# Patient Record
Sex: FEMALE | Race: BLACK OR AFRICAN AMERICAN | NOT HISPANIC OR LATINO | URBAN - METROPOLITAN AREA
[De-identification: names, ages, dates, MRNs, and addresses within clinical notes are randomized per-mention and may not be internally consistent; named-entity substitution may affect disease eponyms.]

---

## 2024-08-01 ENCOUNTER — INPATIENT (INPATIENT)
Facility: HOSPITAL | Age: 74
LOS: 2 days | Discharge: HOME | DRG: 66 | End: 2024-08-04
Attending: STUDENT IN AN ORGANIZED HEALTH CARE EDUCATION/TRAINING PROGRAM | Admitting: STUDENT IN AN ORGANIZED HEALTH CARE EDUCATION/TRAINING PROGRAM
Payer: COMMERCIAL

## 2024-08-01 VITALS
HEART RATE: 103 BPM | HEIGHT: 65 IN | OXYGEN SATURATION: 99 % | DIASTOLIC BLOOD PRESSURE: 129 MMHG | WEIGHT: 130.07 LBS | RESPIRATION RATE: 20 BRPM | TEMPERATURE: 98 F | SYSTOLIC BLOOD PRESSURE: 194 MMHG

## 2024-08-01 LAB
ALBUMIN SERPL ELPH-MCNC: 4.2 G/DL — SIGNIFICANT CHANGE UP (ref 3.3–5)
ALP SERPL-CCNC: 122 U/L — HIGH (ref 40–120)
ALT FLD-CCNC: 16 U/L — SIGNIFICANT CHANGE UP (ref 10–45)
ANION GAP SERPL CALC-SCNC: 19 MMOL/L — HIGH (ref 5–17)
APTT BLD: 29.2 SEC — SIGNIFICANT CHANGE UP (ref 24.5–35.6)
AST SERPL-CCNC: 21 U/L — SIGNIFICANT CHANGE UP (ref 10–40)
BASOPHILS # BLD AUTO: 0.03 K/UL — SIGNIFICANT CHANGE UP (ref 0–0.2)
BASOPHILS NFR BLD AUTO: 0.4 % — SIGNIFICANT CHANGE UP (ref 0–2)
BILIRUB SERPL-MCNC: 0.6 MG/DL — SIGNIFICANT CHANGE UP (ref 0.2–1.2)
BUN SERPL-MCNC: 15 MG/DL — SIGNIFICANT CHANGE UP (ref 7–23)
CALCIUM SERPL-MCNC: 9.9 MG/DL — SIGNIFICANT CHANGE UP (ref 8.4–10.5)
CHLORIDE SERPL-SCNC: 98 MMOL/L — SIGNIFICANT CHANGE UP (ref 96–108)
CO2 SERPL-SCNC: 22 MMOL/L — SIGNIFICANT CHANGE UP (ref 22–31)
CREAT SERPL-MCNC: 0.85 MG/DL — SIGNIFICANT CHANGE UP (ref 0.5–1.3)
EGFR: 71 ML/MIN/1.73M2 — SIGNIFICANT CHANGE UP
EOSINOPHIL # BLD AUTO: 0.02 K/UL — SIGNIFICANT CHANGE UP (ref 0–0.5)
EOSINOPHIL NFR BLD AUTO: 0.3 % — SIGNIFICANT CHANGE UP (ref 0–6)
GLUCOSE SERPL-MCNC: 245 MG/DL — HIGH (ref 70–99)
HCT VFR BLD CALC: 40.5 % — SIGNIFICANT CHANGE UP (ref 34.5–45)
HGB BLD-MCNC: 14 G/DL — SIGNIFICANT CHANGE UP (ref 11.5–15.5)
IMM GRANULOCYTES NFR BLD AUTO: 0.3 % — SIGNIFICANT CHANGE UP (ref 0–0.9)
INR BLD: 1.07 RATIO — SIGNIFICANT CHANGE UP (ref 0.85–1.18)
LYMPHOCYTES # BLD AUTO: 2.82 K/UL — SIGNIFICANT CHANGE UP (ref 1–3.3)
LYMPHOCYTES # BLD AUTO: 37.4 % — SIGNIFICANT CHANGE UP (ref 13–44)
MAGNESIUM SERPL-MCNC: 2 MG/DL — SIGNIFICANT CHANGE UP (ref 1.6–2.6)
MCHC RBC-ENTMCNC: 29.7 PG — SIGNIFICANT CHANGE UP (ref 27–34)
MCHC RBC-ENTMCNC: 34.6 GM/DL — SIGNIFICANT CHANGE UP (ref 32–36)
MCV RBC AUTO: 86 FL — SIGNIFICANT CHANGE UP (ref 80–100)
MONOCYTES # BLD AUTO: 0.6 K/UL — SIGNIFICANT CHANGE UP (ref 0–0.9)
MONOCYTES NFR BLD AUTO: 7.9 % — SIGNIFICANT CHANGE UP (ref 2–14)
NEUTROPHILS # BLD AUTO: 4.06 K/UL — SIGNIFICANT CHANGE UP (ref 1.8–7.4)
NEUTROPHILS NFR BLD AUTO: 53.7 % — SIGNIFICANT CHANGE UP (ref 43–77)
NRBC # BLD: 0 /100 WBCS — SIGNIFICANT CHANGE UP (ref 0–0)
PHOSPHATE SERPL-MCNC: 2.5 MG/DL — SIGNIFICANT CHANGE UP (ref 2.5–4.5)
PLATELET # BLD AUTO: 294 K/UL — SIGNIFICANT CHANGE UP (ref 150–400)
POTASSIUM SERPL-MCNC: 3.1 MMOL/L — LOW (ref 3.5–5.3)
POTASSIUM SERPL-SCNC: 3.1 MMOL/L — LOW (ref 3.5–5.3)
PROT SERPL-MCNC: 9.2 G/DL — HIGH (ref 6–8.3)
PROTHROM AB SERPL-ACNC: 11.7 SEC — SIGNIFICANT CHANGE UP (ref 9.5–13)
RBC # BLD: 4.71 M/UL — SIGNIFICANT CHANGE UP (ref 3.8–5.2)
RBC # FLD: 12.2 % — SIGNIFICANT CHANGE UP (ref 10.3–14.5)
SODIUM SERPL-SCNC: 139 MMOL/L — SIGNIFICANT CHANGE UP (ref 135–145)
TROPONIN T, HIGH SENSITIVITY RESULT: <6 NG/L — SIGNIFICANT CHANGE UP (ref 0–51)
WBC # BLD: 7.55 K/UL — SIGNIFICANT CHANGE UP (ref 3.8–10.5)
WBC # FLD AUTO: 7.55 K/UL — SIGNIFICANT CHANGE UP (ref 3.8–10.5)

## 2024-08-01 PROCEDURE — 70450 CT HEAD/BRAIN W/O DYE: CPT | Mod: 26,MC,59

## 2024-08-01 PROCEDURE — 70496 CT ANGIOGRAPHY HEAD: CPT | Mod: 26,MC

## 2024-08-01 PROCEDURE — 99285 EMERGENCY DEPT VISIT HI MDM: CPT

## 2024-08-01 PROCEDURE — 70498 CT ANGIOGRAPHY NECK: CPT | Mod: 26,MC

## 2024-08-01 PROCEDURE — 71045 X-RAY EXAM CHEST 1 VIEW: CPT | Mod: 26

## 2024-08-01 RX ORDER — LABETALOL HCL 200 MG
10 TABLET ORAL ONCE
Refills: 0 | Status: COMPLETED | OUTPATIENT
Start: 2024-08-01 | End: 2024-08-01

## 2024-08-01 RX ORDER — LABETALOL HCL 200 MG
20 TABLET ORAL ONCE
Refills: 0 | Status: COMPLETED | OUTPATIENT
Start: 2024-08-01 | End: 2024-08-01

## 2024-08-01 RX ADMIN — Medication 10 MILLIGRAM(S): at 14:28

## 2024-08-01 RX ADMIN — Medication 20 MILLIGRAM(S): at 15:32

## 2024-08-01 NOTE — CONSULT NOTE ADULT - ASSESSMENT
ASSESSMENT   74-year old R handed woman, PMH of pre-diabetes, not on any medications, now presenting for R facial droop of 2 days presentation.  NIHSS 2  MRS 0  LKW 2 days prior to presentation  /117  glucose 112  AC/AP: aspirin 81 daily (not prescribed) for few years duration    IMPRESSION   R facial droop and dysarthria progressively worsening, would consider evaluating for ischemic etiology.    RECOMMENDATION not yet finalized  [] f/u CT head and CTA H/N finalized reads  [] ASA 81mg and Plavix 75mg x3 weeks followed by ASA 81mg QD alone per [facility]ANCE trial  [] start Atorvastatin 80mg QHS, (goal LDL<70)  [] MRI brain w/o contrast  [] TTE with telemetry  [] Check HbA1C and lipid panel  [] Telemonitoring; Neurochecks and vital signs Q4H  [] Permissive HTN up to 220/120 mmHg for first 24 hours after the symptom onset followed by gradual normotension.   [] BGM goals 140-180  [] PT/OT evaluation  [] NPO until clears dysphagia screen, otherwise swallow evaluation  [] Stroke education provided    Patient to be seen by team and attending. Note finalized upon attending attestation.  ASSESSMENT   74-year old R handed woman, PMH of pre-diabetes, not on any medications, now presenting for R facial droop of 2 days presentation.  NIHSS 2  MRS 0  LKW 2 days prior to presentation  /117  glucose 112  AC/AP: aspirin 81 daily (not prescribed) for few years duration    IMPRESSION   R facial droop and dysarthria progressively worsening c/f L brain dysfunction, evaluate for ischemic event etiology unclear at this time, ?    RECOMMENDATION not yet finalized  [] f/u CT head and CTA H/N finalized reads  [] ASA 81mg and Plavix 75mg x3 weeks followed by ASA 81mg QD alone per [facility]ANCE trial  [] start Atorvastatin 80mg QHS, (goal LDL<70)  [] MRI brain w/o contrast  [] TTE with telemetry  [] Check HbA1C and lipid panel  [] Telemonitoring; Neurochecks and vital signs Q4H  [] Permissive HTN up to 220/120 mmHg for first 24 hours after the symptom onset followed by gradual normotension.   [] BGM goals 140-180  [] PT/OT evaluation  [] NPO until clears dysphagia screen, otherwise swallow evaluation  [] Stroke education provided    Patient to be seen by team and attending. Note finalized upon attending attestation.  ASSESSMENT   74-year old R handed woman, PMH of pre-diabetes, not on any medications, now presenting for R facial droop of 2 days presentation. CT head showing focal hypodensity in L basal ganglia, L cerebellum. CTA demonstrating no LVO, moderate luminal narrowing proximal L M1-MCA.   NIHSS 2  MRS 0  LKW 2 days prior to presentation  /117  glucose 112  AC/AP: aspirin 81 daily (not prescribed) for few years duration    IMPRESSION   R facial droop and dysarthria progressively worsening c/f L brain dysfunction, evaluate for ischemic event etiology unclear at this time.    RECOMMENDATION   [] CDU for MRI brain w/o contrast to evaluate further regarding hypodensities on CT imaging  [] ASA 81mg and Plavix 75mg daily  [] start Atorvastatin 80mg QHS, (goal LDL<70)  [] TTE with telemetry  [] Check HbA1C and lipid panel  [] Telemonitoring; Neurochecks and vital signs Q4H  [] Gradual normotension  [] BGM goals 140-180  [] PT/OT evaluation  [] NPO until clears dysphagia screen, otherwise swallow evaluation  [] Stroke education provided    Discussed plan with neurovasc fellow dr lundberg under supervision of dr villalba neurovasc attending, conveyed to primary team.    Patient to be seen by team and attending in AM. Note finalized upon attestation.  ASSESSMENT   74-year old R handed woman, PMH of pre-diabetes, not on any medications, now presenting for R facial droop of 2 days presentation. CT head showing focal hypodensity in L basal ganglia, L cerebellum. CTA demonstrating no LVO, moderate luminal narrowing proximal L M1-MCA.   NIHSS 2  MRS 0  LKW 2 days prior to presentation  /117  glucose 112  AC/AP: aspirin 81 daily (not prescribed) for few years duration    IMPRESSION   R facial droop and dysarthria progressively worsening c/f L brain dysfunction, CT head showing L hypodensity in basal ganglia + L cerebellum. Pending further workup.    RECOMMENDATION   [] CDU for MRI brain w/o contrast to evaluate further regarding hypodensities on CT imaging  [] ASA 81mg and Plavix 75mg daily  [] start Atorvastatin 80mg QHS, (goal LDL<70)  [] TTE with telemetry  [] Check HbA1C and lipid panel  [] Telemonitoring; Neurochecks and vital signs Q4H  [] Gradual normotension  [] BGM goals 140-180  [] PT/OT evaluation  [] NPO until clears dysphagia screen, otherwise swallow evaluation  [] Stroke education provided    Discussed plan with neurovasc fellow dr lundberg under supervision of dr villalba neurovasc attending, conveyed to primary team.    Patient to be seen by team and attending in AM. Note finalized upon attestation.  ASSESSMENT   74-year old R handed woman, PMH of pre-diabetes, not on any medications, now presenting for R facial droop of 2 days presentation. CT head showing focal hypodensity in L basal ganglia, L cerebellum. CTA demonstrating no LVO, moderate luminal narrowing proximal L M1-MCA.   NIHSS 2  MRS 0  LKW 2 days prior to presentation 7/29/24   /117  glucose 112  AC/AP: aspirin 81 daily (not prescribed) for few years duration    IMPRESSION   R facial droop and dysarthria progressively worsening c/f L brain dysfunction, CT head showing L hypodensity in basal ganglia + L cerebellum. Pending further workup.    RECOMMENDATION   [] CDU for MRI brain w/o contrast to evaluate further regarding hypodensities on CT imaging  [] ASA 81mg and Plavix 75mg daily  [] start Atorvastatin 80mg QHS, (goal LDL<70)  [] TTE with telemetry  [] Check HbA1C and lipid panel  [] Telemonitoring; Neurochecks and vital signs Q4H  [] Permissive HTN for first 48-72 hours, then gradual normotension  [] BGM goals 140-180  [] PT/OT evaluation  [] NPO until clears dysphagia screen, otherwise swallow evaluation  [] Stroke education provided    Discussed plan with neurovasc fellow dr lundberg under supervision of dr ovalle neurovasc attending, conveyed to primary team.  Patient to be seen by team and attending in AM. Note finalized upon attestation.

## 2024-08-01 NOTE — ED PROVIDER NOTE - PHYSICAL EXAMINATION
Gen: well appearing, in no acute distress   Head: normal appearing  HEENT: normal conjunctiva, oral mucosa moist, vision grossly intact   Lung: no respiratory distress, speaking in full sentences, CTA b/l, no wheeze, crackles or rhonchi   CV: regular rate and rhythm, no murmurs  Abd: soft, non distended, non tender   MSK: no visible deformities, ambulating without difficulty   Neuro: AAOx3, moving LUE and ASHLY LE with 5/5 strength and RUE with 4/5 strength, R facial droop forehead sparing, speech slow but clear. no word finding difficulty, no pronator drift   Skin: Warm, no rashes   Psych: normal affect

## 2024-08-01 NOTE — ED PROVIDER NOTE - CLINICAL SUMMARY MEDICAL DECISION MAKING FREE TEXT BOX
Maury 74-year-old female history of borderline diabetes presents to ED with 2 days of slurring speech yesterday she noticed facial droop feels unsteady walking denies vision change denies taste change no headache nausea vomiting no numbness or weakness patient denies any trauma head trauma on exam blood pressure systolic over 200 pupils equal round reactive positive facial droop not involving forehead no drift no aphasia right upper 5- out of 5 left upper 5 out of 5 lower extremity 5 out of 5 bilaterally S1-S2 clear to auscultation abdomen soft nontender mildly tachycardic high concern for subacute stroke will CT brain and CTA head and neck blood pressure control consult neurology patient is not on any anticoagulants or antiplatelets

## 2024-08-01 NOTE — ED PROVIDER NOTE - OBJECTIVE STATEMENT
75 y/o F with PMHx of pre-diabetes, not on any medications presents to ED for evaluation of R sided facial droop 2 73 y/o F with PMHx of pre-diabetes, not on any medications presents to ED for evaluation of R sided facial droop 2 days ago. Associated slurred speech, feeling unsteady when walking and gait being slower than normal. Per daughter, the patient had worsening of slurred speech last night and this morning prompting evaluation. Has never been diagnosed with HTN or been told she has problems with her BP. Not on AC. Denies fevers, chills, chest pain, difficulty breathing, vision changes, headache, falls, head trauma or neck pain. NKDA.

## 2024-08-01 NOTE — ED ADULT NURSE REASSESSMENT NOTE - NS ED NURSE REASSESS COMMENT FT1
Report received from Estuardo ESQUIVEL. Pt AXOX4 breathing unlabored on room air and speaking in complete sentences. Pt updated on plan of care. Pt with elevated BP, MD made aware. Meds to be given. Safety and comfort measures maintained. Bed in lowest position.

## 2024-08-01 NOTE — ED PROVIDER NOTE - PROGRESS NOTE DETAILS
Lisa PGY-2: spoke with neuro who will evaluate patient in ED, still pending imaging Gene Gamez DO (PGY3)  Received signout on this patient, 74-year-old female presenting with right sided facial droop for 2 days concern for delayed presentation of a stroke.  Patient to be admitted to neurology service for MRI.  Endorsed plan to daughter and patient at bedside will admit

## 2024-08-01 NOTE — CONSULT NOTE ADULT - SUBJECTIVE AND OBJECTIVE BOX
Neurology - Consult Note    Spectra: [phone] ([facility]), [phone] ([facility])    HPI: 74-year old R handed woman, PMH of pre-diabetes, not on any medications, now presenting for R facial droop of 2 days presentation.    Neurology team consulted for stroke evaluation.  Noticed speech was altered, progressively worsened.     Patient also reports slurred speech, feeling unsteady while walking, slowed gait. Slurred speech had progressively worsened, persistent, therefore presenting to the ED for further evaluation.    Denies swallowing issues, vision issues, hearing issues/ear pain/ringing in ears.  States overall weakness from day prior.  No recent illnesses, no ear pain.   No change in taste.    NIHSS 2  MRS 0  LKW 2 days prior to presentation  /117  glucose 112  AC/AP: aspirin 81 daily (not prescribed) for few years duration    No prior similar episodes.  No FH strokes.  No other meds    Review of Systems:  NEUROLOGICAL: +As stated in HPI above  All other review of systems is negative unless indicated above.    Allergies:  No Known Allergies    PMHx/PSHx/Family Hx: As above, otherwise see below     Social Hx:  as above    Vitals:  T(C): 37.2 (08-01-24 @ 15:15), Max: 37.2 (08-01-24 @ 15:15)  HR: 90 (08-01-24 @ 15:15) (90 - 103)  BP: 201/117 (08-01-24 @ 15:15) (194/129 - 205/111)  RR: 18 (08-01-24 @ 15:15) (18 - 20)  SpO2: 98% (08-01-24 @ 15:15) (98% - 99%)    Physical Examination:   General - non-toxic appearing male/female in no acute distress  Neurologic Exam:  Mental status - Awake, Alert, Oriented to person, place, and time. Speech dysarthric, repetition and naming intact. Follows simple commands.  Cranial nerves - PERRLA (4mm -> 3mm b/l), VFF, EOMI, face sensation (V1-V3) intact b/l, facial strength R facial droop and asymmetry upon smile, hearing intact b/l, palate with symmetric elevation,  sternocleidomastiod 5/5 strength b/l, tongue slightly towards R side on protrusion with full lateral movement  Motor - Normal bulk and tone throughout. No pronator drift.  Strength testing 5/5 b/l UE, LE  Sensation - Light touch intact throughout  Coordination - Finger to Nose intact b/l. Heel-shin intact b/l    Labs:                        14.0   7.55  )-----------( 294      ( 01 Aug 2024 14:53 )             40.5     08-01    139  |  98  |  15  ----------------------------<  245<H>  3.1<L>   |  22  |  0.85    Ca    9.9      01 Aug 2024 14:53  Phos  2.5     08-01  Mg     2.0     08-01    TPro  9.2<H>  /  Alb  4.2  /  TBili  0.6  /  DBili  x   /  AST  21  /  ALT  16  /  AlkPhos  122<H>  08-01    CAPILLARY BLOOD GLUCOSE        LIVER FUNCTIONS - ( 01 Aug 2024 14:53 )  Alb: 4.2 g/dL / Pro: 9.2 g/dL / ALK PHOS: 122 U/L / ALT: 16 U/L / AST: 21 U/L / GGT: x             PT/INR - ( 01 Aug 2024 14:53 )   PT: 11.7 sec;   INR: 1.07 ratio         PTT - ( 01 Aug 2024 14:53 )  PTT:29.2 sec    Radiology:     Neurology - Consult Note    Spectra: [phone] ([facility]), [phone] ([facility])    HPI: 74-year old R handed woman, PMH of pre-diabetes, not on any medications, now presenting for R facial droop of 2 days presentation.    Neurology team consulted for stroke evaluation.  Noticed speech was altered, progressively worsened.     Patient also reports slurred speech, feeling unsteady while walking, slowed gait. Slurred speech had progressively worsened, persistent, therefore presenting to the ED for further evaluation.    Denies swallowing issues, vision issues, hearing issues/ear pain/ringing in ears.  States overall weakness from day prior.  No recent illnesses, no ear pain.   No change in taste.    NIHSS 2  MRS 0  LKW 2 days prior to presentation  /117  glucose 112  AC/AP: aspirin 81 daily (not prescribed) for few years duration    No prior similar episodes.  No FH strokes.  No other meds    Review of Systems:  NEUROLOGICAL: +As stated in HPI above  All other review of systems is negative unless indicated above.    Allergies:  No Known Allergies    PMHx/PSHx/Family Hx: As above, otherwise see below     Social Hx:  as above    Vitals:  T(C): 37.2 (08-01-24 @ 15:15), Max: 37.2 (08-01-24 @ 15:15)  HR: 90 (08-01-24 @ 15:15) (90 - 103)  BP: 201/117 (08-01-24 @ 15:15) (194/129 - 205/111)  RR: 18 (08-01-24 @ 15:15) (18 - 20)  SpO2: 98% (08-01-24 @ 15:15) (98% - 99%)    Physical Examination:   General - non-toxic appearing male/female in no acute distress  Neurologic Exam:  Mental status - Awake, Alert, Oriented to person, place, and time. Speech dysarthric, repetition and naming intact. Follows simple commands.  Cranial nerves - PERRLA (4mm -> 3mm b/l), VFF, EOMI, face sensation (V1-V3) intact b/l, facial strength R facial droop and asymmetry upon smile, hearing intact b/l, palate with symmetric elevation,  sternocleidomastiod 5/5 strength b/l, tongue slightly towards R side on protrusion with full lateral movement  Motor - Normal bulk and tone throughout. No pronator drift.  Strength testing 5/5 b/l UE, LE  Sensation - Light touch intact throughout  Coordination - Finger to Nose intact b/l. Heel-shin intact b/l    Labs:                        14.0   7.55  )-----------( 294      ( 01 Aug 2024 14:53 )             40.5     08-01    139  |  98  |  15  ----------------------------<  245<H>  3.1<L>   |  22  |  0.85    Ca    9.9      01 Aug 2024 14:53  Phos  2.5     08-01  Mg     2.0     08-01    TPro  9.2<H>  /  Alb  4.2  /  TBili  0.6  /  DBili  x   /  AST  21  /  ALT  16  /  AlkPhos  122<H>  08-01    CAPILLARY BLOOD GLUCOSE        LIVER FUNCTIONS - ( 01 Aug 2024 14:53 )  Alb: 4.2 g/dL / Pro: 9.2 g/dL / ALK PHOS: 122 U/L / ALT: 16 U/L / AST: 21 U/L / GGT: x             PT/INR - ( 01 Aug 2024 14:53 )   PT: 11.7 sec;   INR: 1.07 ratio         PTT - ( 01 Aug 2024 14:53 )  PTT:29.2 sec    Radiology:    CTA HEAD  No large vessel occlusion. Moderate luminal narrowing at the proximal left M1-MCA. Suggestion of tiny aneurysmal outpouching at the left supraclinoid ICA. No AVM.    CTA NECK:  No high-grade stenosis or occlusion in extracranial carotid and vertebral arteries.  Coiling/looping of proximal right ICA, anatomical variation.     Neurology - Consult Note    Spectra: [phone] ([facility]), [phone] ([facility])    HPI: 74-year old R handed woman, PMH of pre-diabetes, not on any medications, now presenting for R facial droop of 2 days presentation.    Neurology team consulted for stroke evaluation.  Noticed speech was altered, progressively worsened.     Patient also reports slurred speech, feeling unsteady while walking, slowed gait. Slurred speech had progressively worsened, persistent, therefore presenting to the ED for further evaluation.    Denies swallowing issues, vision issues, hearing issues/ear pain/ringing in ears.  States overall weakness from day prior.  No recent illnesses, no ear pain.   No change in taste.    NIHSS 2  MRS 0  LKW 2 days prior to presentation, 7/29/24  /117  glucose 112  AC/AP: aspirin 81 daily (not prescribed) for few years duration    No prior similar episodes.  No FH strokes.  No other meds    Review of Systems:  NEUROLOGICAL: +As stated in HPI above  All other review of systems is negative unless indicated above.    Allergies:  No Known Allergies    PMHx/PSHx/Family Hx: As above, otherwise see below     Social Hx:  as above    Vitals:  T(C): 37.2 (08-01-24 @ 15:15), Max: 37.2 (08-01-24 @ 15:15)  HR: 90 (08-01-24 @ 15:15) (90 - 103)  BP: 201/117 (08-01-24 @ 15:15) (194/129 - 205/111)  RR: 18 (08-01-24 @ 15:15) (18 - 20)  SpO2: 98% (08-01-24 @ 15:15) (98% - 99%)    Physical Examination:   General - non-toxic appearing male/female in no acute distress  Neurologic Exam:  Mental status - Awake, Alert, Oriented to person, place, and time. Speech dysarthric, repetition and naming intact. Follows simple commands.  Cranial nerves - PERRLA (4mm -> 3mm b/l), VFF, EOMI, face sensation (V1-V3) intact b/l, facial strength R facial droop and asymmetry upon smile, hearing intact b/l, palate with symmetric elevation,  sternocleidomastiod 5/5 strength b/l, tongue slightly towards R side on protrusion with full lateral movement  Motor - Normal bulk and tone throughout. No pronator drift.  Strength testing 5/5 b/l UE, LE  Sensation - Light touch intact throughout  Coordination - Finger to Nose intact b/l. Heel-shin intact b/l    Labs:                        14.0   7.55  )-----------( 294      ( 01 Aug 2024 14:53 )             40.5     08-01    139  |  98  |  15  ----------------------------<  245<H>  3.1<L>   |  22  |  0.85    Ca    9.9      01 Aug 2024 14:53  Phos  2.5     08-01  Mg     2.0     08-01    TPro  9.2<H>  /  Alb  4.2  /  TBili  0.6  /  DBili  x   /  AST  21  /  ALT  16  /  AlkPhos  122<H>  08-01    CAPILLARY BLOOD GLUCOSE        LIVER FUNCTIONS - ( 01 Aug 2024 14:53 )  Alb: 4.2 g/dL / Pro: 9.2 g/dL / ALK PHOS: 122 U/L / ALT: 16 U/L / AST: 21 U/L / GGT: x             PT/INR - ( 01 Aug 2024 14:53 )   PT: 11.7 sec;   INR: 1.07 ratio         PTT - ( 01 Aug 2024 14:53 )  PTT:29.2 sec    Radiology:    CTA HEAD  No large vessel occlusion. Moderate luminal narrowing at the proximal left M1-MCA. Suggestion of tiny aneurysmal outpouching at the left supraclinoid ICA. No AVM.    CTA NECK:  No high-grade stenosis or occlusion in extracranial carotid and vertebral arteries.  Coiling/looping of proximal right ICA, anatomical variation.

## 2024-08-01 NOTE — ED ADULT NURSE NOTE - OBJECTIVE STATEMENT
75 yo female presents to the ED Aaox4 with complaints of slurred speech since Tuesday. PMH HTN, not on medications. Pt states speech was slurred x 2 days, endorsing increase in weakness while trying to perform ADLS. daughter at bedside states she saw her mom x yesterday, noticed slurred speech, facial droop. Pt Denies headache, dizziness, vision changes, chest pain, shortness of breath, abdominal pain, nausea, vomiting, diarrhea, fevers, chills, dysuria, hematuria, recent illness travel or fall.

## 2024-08-02 DIAGNOSIS — E78.5 HYPERLIPIDEMIA, UNSPECIFIED: ICD-10-CM

## 2024-08-02 DIAGNOSIS — I63.9 CEREBRAL INFARCTION, UNSPECIFIED: ICD-10-CM

## 2024-08-02 DIAGNOSIS — E11.65 TYPE 2 DIABETES MELLITUS WITH HYPERGLYCEMIA: ICD-10-CM

## 2024-08-02 DIAGNOSIS — I10 ESSENTIAL (PRIMARY) HYPERTENSION: ICD-10-CM

## 2024-08-02 LAB
A1C WITH ESTIMATED AVERAGE GLUCOSE RESULT: 10.7 % — HIGH (ref 4–5.6)
ANION GAP SERPL CALC-SCNC: 11 MMOL/L — SIGNIFICANT CHANGE UP (ref 5–17)
ANION GAP SERPL CALC-SCNC: 14 MMOL/L — SIGNIFICANT CHANGE UP (ref 5–17)
BUN SERPL-MCNC: 16 MG/DL — SIGNIFICANT CHANGE UP (ref 7–23)
BUN SERPL-MCNC: 21 MG/DL — SIGNIFICANT CHANGE UP (ref 7–23)
CALCIUM SERPL-MCNC: 9.1 MG/DL — SIGNIFICANT CHANGE UP (ref 8.4–10.5)
CALCIUM SERPL-MCNC: 9.7 MG/DL — SIGNIFICANT CHANGE UP (ref 8.4–10.5)
CHLORIDE SERPL-SCNC: 101 MMOL/L — SIGNIFICANT CHANGE UP (ref 96–108)
CHLORIDE SERPL-SCNC: 99 MMOL/L — SIGNIFICANT CHANGE UP (ref 96–108)
CHOLEST SERPL-MCNC: 219 MG/DL — HIGH
CO2 SERPL-SCNC: 23 MMOL/L — SIGNIFICANT CHANGE UP (ref 22–31)
CO2 SERPL-SCNC: 25 MMOL/L — SIGNIFICANT CHANGE UP (ref 22–31)
CREAT SERPL-MCNC: 0.9 MG/DL — SIGNIFICANT CHANGE UP (ref 0.5–1.3)
CREAT SERPL-MCNC: 1.11 MG/DL — SIGNIFICANT CHANGE UP (ref 0.5–1.3)
EGFR: 52 ML/MIN/1.73M2 — LOW
EGFR: 66 ML/MIN/1.73M2 — SIGNIFICANT CHANGE UP
ESTIMATED AVERAGE GLUCOSE: 260 MG/DL — HIGH (ref 68–114)
GLUCOSE BLDC GLUCOMTR-MCNC: 281 MG/DL — HIGH (ref 70–99)
GLUCOSE BLDC GLUCOMTR-MCNC: 291 MG/DL — HIGH (ref 70–99)
GLUCOSE SERPL-MCNC: 205 MG/DL — HIGH (ref 70–99)
GLUCOSE SERPL-MCNC: 317 MG/DL — HIGH (ref 70–99)
HCT VFR BLD CALC: 38.2 % — SIGNIFICANT CHANGE UP (ref 34.5–45)
HDLC SERPL-MCNC: 43 MG/DL — LOW
HGB BLD-MCNC: 13.2 G/DL — SIGNIFICANT CHANGE UP (ref 11.5–15.5)
LIPID PNL WITH DIRECT LDL SERPL: 149 MG/DL — HIGH
MCHC RBC-ENTMCNC: 30 PG — SIGNIFICANT CHANGE UP (ref 27–34)
MCHC RBC-ENTMCNC: 34.6 GM/DL — SIGNIFICANT CHANGE UP (ref 32–36)
MCV RBC AUTO: 86.8 FL — SIGNIFICANT CHANGE UP (ref 80–100)
NON HDL CHOLESTEROL: 176 MG/DL — HIGH
NRBC # BLD: 0 /100 WBCS — SIGNIFICANT CHANGE UP (ref 0–0)
PLATELET # BLD AUTO: 305 K/UL — SIGNIFICANT CHANGE UP (ref 150–400)
POTASSIUM SERPL-MCNC: 2.7 MMOL/L — CRITICAL LOW (ref 3.5–5.3)
POTASSIUM SERPL-MCNC: 3.8 MMOL/L — SIGNIFICANT CHANGE UP (ref 3.5–5.3)
POTASSIUM SERPL-SCNC: 2.7 MMOL/L — CRITICAL LOW (ref 3.5–5.3)
POTASSIUM SERPL-SCNC: 3.8 MMOL/L — SIGNIFICANT CHANGE UP (ref 3.5–5.3)
RBC # BLD: 4.4 M/UL — SIGNIFICANT CHANGE UP (ref 3.8–5.2)
RBC # FLD: 12.6 % — SIGNIFICANT CHANGE UP (ref 10.3–14.5)
SODIUM SERPL-SCNC: 135 MMOL/L — SIGNIFICANT CHANGE UP (ref 135–145)
SODIUM SERPL-SCNC: 138 MMOL/L — SIGNIFICANT CHANGE UP (ref 135–145)
TRIGL SERPL-MCNC: 149 MG/DL — SIGNIFICANT CHANGE UP
TSH SERPL-MCNC: 1.06 UIU/ML — SIGNIFICANT CHANGE UP (ref 0.27–4.2)
WBC # BLD: 11.01 K/UL — HIGH (ref 3.8–10.5)
WBC # FLD AUTO: 11.01 K/UL — HIGH (ref 3.8–10.5)

## 2024-08-02 PROCEDURE — 70551 MRI BRAIN STEM W/O DYE: CPT | Mod: 26

## 2024-08-02 PROCEDURE — 99255 IP/OBS CONSLTJ NEW/EST HI 80: CPT

## 2024-08-02 RX ORDER — ATORVASTATIN CALCIUM 40 MG/1
80 TABLET, FILM COATED ORAL AT BEDTIME
Refills: 0 | Status: DISCONTINUED | OUTPATIENT
Start: 2024-08-02 | End: 2024-08-04

## 2024-08-02 RX ORDER — INSULIN LISPRO 100/ML
VIAL (ML) SUBCUTANEOUS
Refills: 0 | Status: DISCONTINUED | OUTPATIENT
Start: 2024-08-02 | End: 2024-08-04

## 2024-08-02 RX ORDER — DEXTROSE MONOHYDRATE, SODIUM CHLORIDE, SODIUM LACTATE, CALCIUM CHLORIDE, MAGNESIUM CHLORIDE 1.5; 538; 448; 18.4; 5.08 G/100ML; MG/100ML; MG/100ML; MG/100ML; MG/100ML
1000 SOLUTION INTRAPERITONEAL
Refills: 0 | Status: DISCONTINUED | OUTPATIENT
Start: 2024-08-02 | End: 2024-08-04

## 2024-08-02 RX ORDER — DEXTROSE 4 G
25 TABLET,CHEWABLE ORAL ONCE
Refills: 0 | Status: DISCONTINUED | OUTPATIENT
Start: 2024-08-02 | End: 2024-08-04

## 2024-08-02 RX ORDER — DEXTROSE 4 G
15 TABLET,CHEWABLE ORAL ONCE
Refills: 0 | Status: DISCONTINUED | OUTPATIENT
Start: 2024-08-02 | End: 2024-08-04

## 2024-08-02 RX ORDER — LOSARTAN POTASSIUM 50 MG/1
25 TABLET, FILM COATED ORAL DAILY
Refills: 0 | Status: DISCONTINUED | OUTPATIENT
Start: 2024-08-02 | End: 2024-08-04

## 2024-08-02 RX ORDER — ENOXAPARIN SODIUM 120 MG/.8ML
40 INJECTION SUBCUTANEOUS EVERY 24 HOURS
Refills: 0 | Status: DISCONTINUED | OUTPATIENT
Start: 2024-08-02 | End: 2024-08-04

## 2024-08-02 RX ORDER — LORAZEPAM 1 MG/1
1 TABLET ORAL ONCE
Refills: 0 | Status: DISCONTINUED | OUTPATIENT
Start: 2024-08-02 | End: 2024-08-02

## 2024-08-02 RX ORDER — INSULIN GLARGINE-YFGN 100 [IU]/ML
12 INJECTION, SOLUTION SUBCUTANEOUS AT BEDTIME
Refills: 0 | Status: DISCONTINUED | OUTPATIENT
Start: 2024-08-02 | End: 2024-08-03

## 2024-08-02 RX ORDER — LORAZEPAM 1 MG/1
1 TABLET ORAL EVERY 24 HOURS
Refills: 0 | Status: DISCONTINUED | OUTPATIENT
Start: 2024-08-02 | End: 2024-08-02

## 2024-08-02 RX ORDER — INSULIN LISPRO 100/ML
4 VIAL (ML) SUBCUTANEOUS
Refills: 0 | Status: DISCONTINUED | OUTPATIENT
Start: 2024-08-02 | End: 2024-08-03

## 2024-08-02 RX ORDER — INSULIN LISPRO 100/ML
VIAL (ML) SUBCUTANEOUS AT BEDTIME
Refills: 0 | Status: DISCONTINUED | OUTPATIENT
Start: 2024-08-02 | End: 2024-08-04

## 2024-08-02 RX ORDER — POTASSIUM CHLORIDE 1500 MG/1
40 TABLET, EXTENDED RELEASE ORAL EVERY 4 HOURS
Refills: 0 | Status: COMPLETED | OUTPATIENT
Start: 2024-08-02 | End: 2024-08-02

## 2024-08-02 RX ORDER — CLOPIDOGREL BISULFATE 75 MG/1
75 TABLET, FILM COATED ORAL DAILY
Refills: 0 | Status: DISCONTINUED | OUTPATIENT
Start: 2024-08-02 | End: 2024-08-04

## 2024-08-02 RX ORDER — GLUCAGON INJECTION, SOLUTION 0.5 MG/.1ML
1 INJECTION, SOLUTION SUBCUTANEOUS ONCE
Refills: 0 | Status: DISCONTINUED | OUTPATIENT
Start: 2024-08-02 | End: 2024-08-04

## 2024-08-02 RX ORDER — DEXTROSE 4 G
12.5 TABLET,CHEWABLE ORAL ONCE
Refills: 0 | Status: DISCONTINUED | OUTPATIENT
Start: 2024-08-02 | End: 2024-08-04

## 2024-08-02 RX ORDER — ASPIRIN 500 MG
81 TABLET ORAL DAILY
Refills: 0 | Status: DISCONTINUED | OUTPATIENT
Start: 2024-08-02 | End: 2024-08-04

## 2024-08-02 RX ADMIN — POTASSIUM CHLORIDE 40 MILLIEQUIVALENT(S): 1500 TABLET, EXTENDED RELEASE ORAL at 14:36

## 2024-08-02 RX ADMIN — LOSARTAN POTASSIUM 25 MILLIGRAM(S): 50 TABLET, FILM COATED ORAL at 17:25

## 2024-08-02 RX ADMIN — POTASSIUM CHLORIDE 40 MILLIEQUIVALENT(S): 1500 TABLET, EXTENDED RELEASE ORAL at 10:46

## 2024-08-02 RX ADMIN — Medication 81 MILLIGRAM(S): at 12:28

## 2024-08-02 RX ADMIN — Medication 1: at 22:10

## 2024-08-02 RX ADMIN — LORAZEPAM 1 MILLIGRAM(S): 1 TABLET ORAL at 15:47

## 2024-08-02 RX ADMIN — INSULIN GLARGINE-YFGN 12 UNIT(S): 100 INJECTION, SOLUTION SUBCUTANEOUS at 22:10

## 2024-08-02 RX ADMIN — Medication 4 UNIT(S): at 17:24

## 2024-08-02 RX ADMIN — ATORVASTATIN CALCIUM 80 MILLIGRAM(S): 40 TABLET, FILM COATED ORAL at 22:09

## 2024-08-02 RX ADMIN — CLOPIDOGREL BISULFATE 75 MILLIGRAM(S): 75 TABLET, FILM COATED ORAL at 12:27

## 2024-08-02 RX ADMIN — Medication 3: at 17:23

## 2024-08-02 RX ADMIN — POTASSIUM CHLORIDE 40 MILLIEQUIVALENT(S): 1500 TABLET, EXTENDED RELEASE ORAL at 17:24

## 2024-08-02 NOTE — H&P ADULT - HISTORY OF PRESENT ILLNESS
74-year old R handed woman, PMH of pre-diabetes, not on any medications, now presenting for R facial droop of 2 days presentation.    Neurology team consulted for stroke evaluation.  Noticed speech was altered, progressively worsened.     Patient also reports slurred speech, feeling unsteady while walking, slowed gait. Slurred speech had progressively worsened, persistent, therefore presenting to the ED for further evaluation.    Denies swallowing issues, vision issues, hearing issues/ear pain/ringing in ears.  States overall weakness from day prior.  No recent illnesses, no ear pain.   No change in taste.    NIHSS 2  MRS 0  LKW 2 days prior to presentation, 7/29/24  /117  glucose 112  AC/AP: aspirin 81 daily (not prescribed) for few years duration    No prior similar episodes.  No FH strokes.  No other meds

## 2024-08-02 NOTE — PROVIDER CONTACT NOTE (CRITICAL VALUE NOTIFICATION) - SITUATION
NEW PATIENT VISIT    CHIEF COMPLAINT:  Right index finger animal bite       HISTORY OF PRESENT ILLNESS  This is a 29 year old  female presenting for evaluation of right index finger and will bite.  Her pat a good fit her on 2022.  She was seen by family medicine who showed her wound care as well as prescribed Augmentin.  She does have a history of a soft tissue mallet deformity of his finger which was repaired approximately 10 years ago.  Overall she states that she is healing well as not have any concerns with the finger.  She has this year prophylactically case there is any thing was going on the wounds are healing routinely and she has the same motion she had in her finger prior to the bite.    PAST MEDICAL HISTORY  Past Medical History:   Diagnosis Date   • Malignant neoplasm (CMS/HCC)    • Tattoo     left arm, back and facial and umbil piercings       PAST SURGICAL HISTORY  Past Surgical History:   Procedure Laterality Date   • Iud levonorgestrela (azucean) 13.5mg  16   • Lasik Bilateral    • Lasik Bilateral    • Tendon repair Left    • [place] tooth extraction          SOCIAL HISTORY  Social History     Occupational History   • Occupation: Senior Therapist   Tobacco Use   • Smoking status: Former Smoker     Packs/day: 0.20     Years: 3.00     Pack years: 0.60     Types: Cigarettes     Quit date: 5/15/2010     Years since quittin.7   • Smokeless tobacco: Never Used   Substance and Sexual Activity   • Alcohol use: Yes     Alcohol/week: 14.0 standard drinks     Types: 14 Glasses of wine per week   • Drug use: No   • Sexual activity: Yes     Partners: Female, Male     Birth control/protection: Condom     Comment: Azucena bhat 16       FAMILY HISTORY  Family History   Problem Relation Age of Onset   • NEGATIVE FAMILY HX OF Mother    • Bipolar disorder Mother    • Hyperlipidemia Sister    • Hypertension Sister    • Arthritis Maternal Grandmother    • Diabetes Maternal Grandmother    •  Heart Maternal Grandmother    • Hypertension Maternal Grandmother    • Hyperlipidemia Maternal Grandmother    • Glaucoma Maternal Grandmother    • Dementia/Alzheimers Maternal Grandmother    • Asthma Brother    • Patient is unaware of any medical problems Father    • Hypertension Maternal Grandfather    • Heart Maternal Grandfather    • Glaucoma Maternal Grandfather        MEDICATIONS  Current Outpatient Medications   Medication Sig Dispense Refill   • amoxicillin-clavulanate (AUGMENTIN) 875-125 MG per tablet Take 1 tablet by mouth 2 times daily for 7 days. 14 tablet 0   • Multiple Vitamins-Minerals (vitamin - therapeutic multivitamins w/minerals) tablet      • drospirenone-ethinyl estradiol (ANURADHA) 3-0.02 MG per tablet TAKE 1 TABLET BY MOUTH EVERY DAY 28 tablet 0   • CRANBERRY EXTRACT PO      • Cholecalciferol (Vitamin D) 50 mcg (2,000 units) tablet Take 1 tablet by mouth daily.     • benzonatate (TESSALON PERLES) 100 MG capsule Take 1 capsule by mouth 3 times daily as needed for Cough. 20 capsule 0   • fluticasone (FLONASE) 50 MCG/ACT nasal spray Spray 1 spray in each nostril 2 times daily. 16 g 0     No current facility-administered medications for this visit.        DRUG ALLERGIES  ALLERGIES:   Allergen Reactions   • Adhesive   (Environmental) Dermatitis   • Hydrocodone Nausea & Vomiting        REVIEW OF SYSTEMS  Review of systems was personally reviewed by me as documented by the medical assistant in the electronic medical record.    PHYSICAL EXAMINATION  GENERAL: This is a well-developed, well-nourished, age-appropriate patient in no acute distress. The patient is alert and oriented x3. Pleasant and cooperative.  HEAD: Normocephalic, atraumatic.  PSYCHIATRIC: Mood is euthymic. Affect appears appropriate.   RESPIRATORY: There is equal chest rise on inspection. Breathing is non-labored with no audible wheezing.  CARDIOVASCULAR: Radial pulses are 2+ and symmetrical. There is no upper extremity lymphedema, cyanosis,  or clubbing.   SKIN: No obvious skin lesions or rashes are noted. Skin is warm to touch.  NEUROLOGIC: No ataxia.     UPPER EXTREMITIES:   On the right side there is excellent elbow, forearm, wrist, and hand motion.  DPC is zero for all digits.  Finger extension is full.   Median, radial, and ulnar nerves are intact to motor and sensory function, with any exceptions noted below. 2+ radial pulse and brisk capillary refill. There is no atrophy and strength is satisfactory.     Index finger has a 15 degree extensor lag which she states her finger is always tripped a little bit after the surgery.  Wounds are healing well by secondary intention no sutures are in place.  FDS FDP are intact there are no wounds on the volar surface extension is full in the finger much less the slight extensor lag at the D IP joint.    REVIEW OF X-RAYS/STUDIES  Radiographs three views of the right index finger were reviewed showing old avulsion fragment consistent with her mallet injury history.  No other fractures noted.    IMPRESSION/DIAGNOSIS/PLAN  29-year-old female with a had a good bite on her index finger tip from 02/21/2022     We discussed with her that everything appears to be healing up well.  She is to continue her Augmentin through its course.  She may use a Band-Aid for dressing for the wound.  She may return to our clinic on an as-needed basis or if there are any concerns that she has that she wants to be evaluated again.  Patient understood and agreed with plan of care.      Ishan Pfeiffer MD   Patient potassium level 2.7

## 2024-08-02 NOTE — SPEECH LANGUAGE PATHOLOGY EVALUATION - SLP PERTINENT HISTORY OF CURRENT PROBLEM
74-year old R handed woman, PMH of pre-diabetes, not on any medications, now presenting for R facial droop of 2 days presentation. Neurology team consulted for stroke evaluation. Noticed speech was altered, progressively worsened. Patient also reports slurred speech, feeling unsteady while walking, slowed gait. Slurred speech had progressively worsened, persistent, therefore presenting to the ED for further evaluation.  Denies swallowing issues, vision issues, hearing issues/ear pain/ringing in ears. States overall weakness from day prior. No recent illnesses, no ear pain. No change in taste. NIHSS 2 MRS 0 LKW 2 days prior to presentation, 7/29/24 /117 glucose 112 AC/AP: aspirin 81 daily (not prescribed) for few years duration. No prior similar episodes. No FH strokes. No other meds.

## 2024-08-02 NOTE — CONSULT NOTE ADULT - ATTENDING COMMENTS
73yo F with hx of prediabetes presenting with R. facial droop x 2 days, admitted for CVA evaluation. Endocrinology consulted for assistance with newly diagnosed DM2, uncontrolled, A1c 10.7% with hyperglycemia to 200s, not in DKA. Initiate subcutaneous basal/bolus insulin regimen as detailed above. For discharge may consider metformin + glp1 agonist regimen, will decide on basal insulin closer to discharge. Patient will require DM/glucometer education with bedside RN prior to discharge. Would also benefit from RD consult. Currently on high intensity statin. In terms of BP management, defer to primary/neurology teams given concern for acute CVA; currently on ARB. Will continue to follow. 73yo F with hx of prediabetes presenting with R. facial droop x 2 days, admitted for CVA evaluation. Endocrinology consulted for assistance with newly diagnosed DM2, uncontrolled, A1c 10.7% with hyperglycemia to 200s, not in DKA. Patient is high risk with high level decision making due to uncontrolled diabetes with A1C>10 which places patient at high risk for cardiovascular and cerebrovascular events. Patient with lability of glucose requiring close monitoring and insulin adjustments. Initiate subcutaneous basal/bolus insulin regimen as detailed above. For discharge may consider metformin + glp1 agonist regimen, will decide on basal insulin closer to discharge. Patient will require DM/glucometer education with bedside RN prior to discharge. Would also benefit from RD consult. Currently on high intensity statin. In terms of BP management, defer to primary/neurology teams given concern for acute CVA; currently on ARB. Will continue to follow.

## 2024-08-02 NOTE — H&P ADULT - ASSESSMENT
ASSESSMENT   74-year old R handed woman, PMH of pre-diabetes, not on any medications, now presenting for R facial droop of 2 days presentation. CT head showing focal hypodensity in L basal ganglia, L cerebellum. CTA demonstrating no LVO, moderate luminal narrowing proximal L M1-MCA.   NIHSS 2  MRS 0  LKW 2 days prior to presentation 7/29/24   /117  glucose 112  AC/AP: aspirin 81 daily (not prescribed) for few years duration    IMPRESSION   R facial droop and dysarthria progressively worsening c/f L brain dysfunction, CT head showing L hypodensity in basal ganglia + L cerebellum. Pending further workup.    RECOMMENDATION   [] Admit to stroke floor for MRI brain w/o contrast to evaluate further regarding hypodensities on CT imaging  [] Patient will require sedation for MRI, If patient refuses or has difficulty with MRI, obtain repeat CTH non-contrast 24 hrs from presentation.  [] ASA 81mg and Plavix 75mg daily  [] start Atorvastatin 80mg QHS, (goal LDL<70)  [] TTE with telemetry  [] Check HbA1C and lipid panel  [] Telemonitoring; Neurochecks and vital signs Q4H  [] Permissive HTN for first 48-72 hours, then gradual normotension  [] BGM goals 140-180  [] PT/OT evaluation  [] NPO until clears dysphagia screen, otherwise swallow evaluation  [] Stroke education provided    Discussed plan with neurovasc fellow dr lundberg under supervision of dr ovalle neurovasc attending, conveyed to primary team.  Patient to be seen by team and attending in AM. Note finalized upon attestation.

## 2024-08-02 NOTE — H&P ADULT - NSHPPHYSICALEXAM_GEN_ALL_CORE
Physical Examination:   General - non-toxic appearing male/female in no acute distress  Neurologic Exam:  Mental status - Awake, Alert, Oriented to person, place, and time. Speech dysarthric, repetition and naming intact. Follows simple commands.  Cranial nerves - PERRLA (4mm -> 3mm b/l), VFF, EOMI, face sensation (V1-V3) intact b/l, facial strength R facial droop and asymmetry upon smile, hearing intact b/l, palate with symmetric elevation,  sternocleidomastiod 5/5 strength b/l, tongue slightly towards R side on protrusion with full lateral movement  Motor - Normal bulk and tone throughout. No pronator drift.  Strength testing 5/5 b/l UE, LE  Sensation - Light touch intact throughout  Coordination - Finger to Nose intact b/l. Heel-shin intact b/l

## 2024-08-02 NOTE — OCCUPATIONAL THERAPY INITIAL EVALUATION ADULT - GENERAL OBSERVATIONS, REHAB EVAL
pt received semi-supine in bed +IVL, +daughter at bedside pt received semi-supine in bed +IVL, +tele, +daughter at bedside

## 2024-08-02 NOTE — CONSULT NOTE ADULT - SUBJECTIVE AND OBJECTIVE BOX
HPI:  74-year old R handed woman, PMH of pre-diabetes, not on any medications, now presenting for R facial droop of 2 days presentation.    Neurology team consulted for stroke evaluation.  Noticed speech was altered, progressively worsened.     Patient also reports slurred speech, feeling unsteady while walking, slowed gait. Slurred speech had progressively worsened, persistent, therefore presenting to the ED for further evaluation.    Denies swallowing issues, vision issues, hearing issues/ear pain/ringing in ears.  States overall weakness from day prior.  No recent illnesses, no ear pain.   No change in taste.    NIHSS 2  MRS 0  LKW 2 days prior to presentation, 7/29/24  /117  glucose 112  AC/AP: aspirin 81 daily (not prescribed) for few years duration    No prior similar episodes.  No FH strokes.  No other meds (02 Aug 2024 01:04)      Endocrine History:    Newly dx DMII with a HgbA1c of 10.7. Was previously told that she had pre-diabetes about 10 years ago, but managed with diet and activity and stopped checking blood sugars. Has not had HgbA1c checked since.    Last A1c: 10.7   Current GFR 66 ml/min/1.73m2  BMI: 21.7  Weight: 59 kg  Current Meds: none  Compliance: N/A  Side effects to medications: Occasionally has lower leg swelling. Patient denies diarrhea, decreased appetite, nausea/vomiting, weight gain/loss, UTIs, pancreatitis  Medications tried in past: none  Who administers medications: N/A  Support system: lives with daugther  Glucose monitoring:  does not track  Hypoglycemic episodes? none that are known  Any hx of DKA: no  Microvascular complications: none  Macrovascular complications: CVA, no MI  FHx: no DM or thyroid disorders  Outpatient diet:   Breakfast: wheat bread, eggs  Lunch: usually no lunch  Dinner: whole grain rice, a meat, and a vegetable  Snacks: cookies  Activity: sedentary  Appetite: moderate  Inpatient diet: consistent carb with evening snack  Inpatient diabetes regimen: low correction scale at meals  On steroids inpatient? no  On dextrose-containing fluids inpatient? no  Statin: atorvastatin 80  ACEi/ARB: none  Insurance: Losartan 25  Upcoming follow-ups/appointments: none      PAST MEDICAL & SURGICAL HISTORY:      MEDICATIONS  (STANDING):  aspirin  chewable 81 milliGRAM(s) Oral daily  atorvastatin 80 milliGRAM(s) Oral at bedtime  clopidogrel Tablet 75 milliGRAM(s) Oral daily  dextrose 5%. 1000 milliLiter(s) (100 mL/Hr) IV Continuous <Continuous>  dextrose 5%. 1000 milliLiter(s) (50 mL/Hr) IV Continuous <Continuous>  dextrose 50% Injectable 25 Gram(s) IV Push once  dextrose 50% Injectable 25 Gram(s) IV Push once  dextrose 50% Injectable 12.5 Gram(s) IV Push once  enoxaparin Injectable 40 milliGRAM(s) SubCutaneous every 24 hours  glucagon  Injectable 1 milliGRAM(s) IntraMuscular once  insulin lispro (ADMELOG) corrective regimen sliding scale   SubCutaneous three times a day before meals  losartan 25 milliGRAM(s) Oral daily  potassium chloride    Tablet ER 40 milliEquivalent(s) Oral every 4 hours    MEDICATIONS  (PRN):  dextrose Oral Gel 15 Gram(s) Oral once PRN Blood Glucose LESS THAN 70 milliGRAM(s)/deciliter      Allergies    No Known Allergies    Intolerances      Review of Systems: as above    ===================PHYSICAL EXAM=======================  VITALS: T(C): 36.5 (08-02-24 @ 13:40)  T(F): 97.7 (08-02-24 @ 13:40), Max: 99 (08-01-24 @ 15:15)  HR: 97 (08-02-24 @ 13:40) (82 - 97)  BP: 157/92 (08-02-24 @ 13:40) (157/92 - 205/111)  RR:  (17 - 18)  SpO2:  (94% - 100%)  Wt(kg): --  GENERAL: NAD  EYES: No proptosis, no lid lag, anicteric  THYROID: No visible goiter  RESPIRATORY: Clear to auscultation bilaterally  CARDIOVASCULAR: Regular rate and rhythm  GI: Soft, nontender, non distended  EXT: b/l feet without wounds; warm and well perfused, no AZUL  PSYCH: Alert and oriented x 3, reactive mood  ======================================================                            13.2   11.01 )-----------( 305      ( 02 Aug 2024 07:23 )             38.2       08-02    138  |  99  |  16  ----------------------------<  205<H>  2.7<LL>   |  25  |  0.90    eGFR: 66    Ca    9.7      08-02  Mg     2.0     08-01  Phos  2.5     08-01    TPro  9.2<H>  /  Alb  4.2  /  TBili  0.6  /  DBili  x   /  AST  21  /  ALT  16  /  AlkPhos  122<H>  08-01      Thyroid Function Tests:  08-01 @ 14:53 TSH 1.06 FreeT4 -- T3 -- Anti TPO -- Anti Thyroglobulin Ab -- TSI --      A1C with Estimated Average Glucose Result: 10.7 % (08-01-24 @ 14:53)      08-02 Chol 219<H> Direct LDL -- LDL calculated 149<H> HDL 43<L> Trig 149    Radiology:                HPI:  74-year old R handed woman, PMH of pre-diabetes, not on any medications, now presenting for R facial droop of 2 days presentation.    Neurology team consulted for stroke evaluation.  Noticed speech was altered, progressively worsened.     Patient also reports slurred speech, feeling unsteady while walking, slowed gait. Slurred speech had progressively worsened, persistent, therefore presenting to the ED for further evaluation.    Denies swallowing issues, vision issues, hearing issues/ear pain/ringing in ears.  States overall weakness from day prior.  No recent illnesses, no ear pain.   No change in taste.    NIHSS 2  MRS 0  LKW 2 days prior to presentation, 7/29/24  /117  glucose 112  AC/AP: aspirin 81 daily (not prescribed) for few years duration    No prior similar episodes.  No FH strokes.  No other meds (02 Aug 2024 01:04)      Endocrine History:    Newly dx DMII with a HgbA1c of 10.7. Was previously told that she had pre-diabetes about 10 years ago, but managed with diet and activity and stopped checking blood sugars. Has not had HgbA1c checked since.    Last A1c: 10.7   Current GFR 66 ml/min/1.73m2  BMI: 21.7  Weight: 59 kg  Current Meds: none  Compliance: N/A  Side effects to medications: Occasionally has lower leg swelling. Patient denies diarrhea, decreased appetite, nausea/vomiting, weight gain/loss, UTIs, pancreatitis  Medications tried in past: none  Who administers medications: N/A  Support system: lives with daugther  Glucose monitoring:  does not track  Hypoglycemic episodes? none that are known  Any hx of DKA: no  Microvascular complications: none  Macrovascular complications: CVA, no MI  FHx: no DM or thyroid disorders  Outpatient diet:   Breakfast: wheat bread, eggs  Lunch: usually no lunch  Dinner: whole grain rice, a meat, and a vegetable  Snacks: cookies  Activity: sedentary  Appetite: moderate  Inpatient diet: consistent carb with evening snack  Inpatient diabetes regimen: low correction scale at meals  On steroids inpatient? no  On dextrose-containing fluids inpatient? no  Insurance:   Upcoming follow-ups/appointments: none      PAST MEDICAL & SURGICAL HISTORY:      MEDICATIONS  (STANDING):  aspirin  chewable 81 milliGRAM(s) Oral daily  atorvastatin 80 milliGRAM(s) Oral at bedtime  clopidogrel Tablet 75 milliGRAM(s) Oral daily  dextrose 5%. 1000 milliLiter(s) (100 mL/Hr) IV Continuous <Continuous>  dextrose 5%. 1000 milliLiter(s) (50 mL/Hr) IV Continuous <Continuous>  dextrose 50% Injectable 25 Gram(s) IV Push once  dextrose 50% Injectable 25 Gram(s) IV Push once  dextrose 50% Injectable 12.5 Gram(s) IV Push once  enoxaparin Injectable 40 milliGRAM(s) SubCutaneous every 24 hours  glucagon  Injectable 1 milliGRAM(s) IntraMuscular once  insulin lispro (ADMELOG) corrective regimen sliding scale   SubCutaneous three times a day before meals  losartan 25 milliGRAM(s) Oral daily  potassium chloride    Tablet ER 40 milliEquivalent(s) Oral every 4 hours    MEDICATIONS  (PRN):  dextrose Oral Gel 15 Gram(s) Oral once PRN Blood Glucose LESS THAN 70 milliGRAM(s)/deciliter      Allergies    No Known Allergies    Intolerances      Review of Systems: as above    ===================PHYSICAL EXAM=======================  VITALS: T(C): 36.5 (08-02-24 @ 13:40)  T(F): 97.7 (08-02-24 @ 13:40), Max: 99 (08-01-24 @ 15:15)  HR: 97 (08-02-24 @ 13:40) (82 - 97)  BP: 157/92 (08-02-24 @ 13:40) (157/92 - 205/111)  RR:  (17 - 18)  SpO2:  (94% - 100%)  Wt(kg): --  GENERAL: NAD  EYES: No proptosis, no lid lag, anicteric  THYROID: No visible goiter  RESPIRATORY: Clear to auscultation bilaterally  CARDIOVASCULAR: Regular rate and rhythm  GI: Soft, nontender, non distended  EXT: b/l feet without wounds; warm and well perfused, no AZUL  PSYCH: Alert and oriented x 3, reactive mood  ======================================================                            13.2   11.01 )-----------( 305      ( 02 Aug 2024 07:23 )             38.2       08-02    138  |  99  |  16  ----------------------------<  205<H>  2.7<LL>   |  25  |  0.90    eGFR: 66    Ca    9.7      08-02  Mg     2.0     08-01  Phos  2.5     08-01    TPro  9.2<H>  /  Alb  4.2  /  TBili  0.6  /  DBili  x   /  AST  21  /  ALT  16  /  AlkPhos  122<H>  08-01      Thyroid Function Tests:  08-01 @ 14:53 TSH 1.06 FreeT4 -- T3 -- Anti TPO -- Anti Thyroglobulin Ab -- TSI --      A1C with Estimated Average Glucose Result: 10.7 % (08-01-24 @ 14:53)      08-02 Chol 219<H> Direct LDL -- LDL calculated 149<H> HDL 43<L> Trig 149    Radiology:                HPI:  74-year old R handed woman, PMH of pre-diabetes, not on any medications, now presenting for R facial droop of 2 days presentation.    Neurology team consulted for stroke evaluation.  Noticed speech was altered, progressively worsened.     Patient also reports slurred speech, feeling unsteady while walking, slowed gait. Slurred speech had progressively worsened, persistent, therefore presenting to the ED for further evaluation.    Denies swallowing issues, vision issues, hearing issues/ear pain/ringing in ears.  States overall weakness from day prior.  No recent illnesses, no ear pain.   No change in taste.    NIHSS 2  MRS 0  LKW 2 days prior to presentation, 7/29/24  /117  glucose 112  AC/AP: aspirin 81 daily (not prescribed) for few years duration    No prior similar episodes.  No FH strokes.  No other meds (02 Aug 2024 01:04)      Endocrine History:    Newly dx DMII with a HgbA1c of 10.7. Was previously told that she had pre-diabetes about 10 years ago, but managed with diet and activity and stopped checking blood sugars. Has not had HgbA1c checked since.    Last A1c: 10.7   Current GFR 66 ml/min/1.73m2  BMI: 21.7  Weight: 59 kg  Current Meds: none  Compliance: N/A  Side effects to medications: Occasionally has lower leg swelling. Patient denies diarrhea, decreased appetite, nausea/vomiting, weight gain/loss, UTIs, pancreatitis  Medications tried in past: none  Who administers medications: N/A  Support system: lives with daugther  Glucose monitoring:  does not track  Hypoglycemic episodes? none that are known  Any hx of DKA: no  Microvascular complications: none  Macrovascular complications: CVA, no MI  FHx: no DM or thyroid disorders  Outpatient diet:   Breakfast: wheat bread, eggs  Lunch: usually no lunch  Dinner: whole grain rice, a meat, and a vegetable  Snacks: cookies  Activity: sedentary  Appetite: moderate  Inpatient diet: consistent carb with evening snack  Inpatient diabetes regimen: low correction scale at meals  On steroids inpatient? no  On dextrose-containing fluids inpatient? no  Insurance: medicaid pending  Upcoming follow-ups/appointments: none      PAST MEDICAL & SURGICAL HISTORY:      MEDICATIONS  (STANDING):  aspirin  chewable 81 milliGRAM(s) Oral daily  atorvastatin 80 milliGRAM(s) Oral at bedtime  clopidogrel Tablet 75 milliGRAM(s) Oral daily  dextrose 5%. 1000 milliLiter(s) (100 mL/Hr) IV Continuous <Continuous>  dextrose 5%. 1000 milliLiter(s) (50 mL/Hr) IV Continuous <Continuous>  dextrose 50% Injectable 25 Gram(s) IV Push once  dextrose 50% Injectable 25 Gram(s) IV Push once  dextrose 50% Injectable 12.5 Gram(s) IV Push once  enoxaparin Injectable 40 milliGRAM(s) SubCutaneous every 24 hours  glucagon  Injectable 1 milliGRAM(s) IntraMuscular once  insulin lispro (ADMELOG) corrective regimen sliding scale   SubCutaneous three times a day before meals  losartan 25 milliGRAM(s) Oral daily  potassium chloride    Tablet ER 40 milliEquivalent(s) Oral every 4 hours    MEDICATIONS  (PRN):  dextrose Oral Gel 15 Gram(s) Oral once PRN Blood Glucose LESS THAN 70 milliGRAM(s)/deciliter      Allergies    No Known Allergies    Intolerances      Review of Systems: as above    ===================PHYSICAL EXAM=======================  VITALS: T(C): 36.5 (08-02-24 @ 13:40)  T(F): 97.7 (08-02-24 @ 13:40), Max: 99 (08-01-24 @ 15:15)  HR: 97 (08-02-24 @ 13:40) (82 - 97)  BP: 157/92 (08-02-24 @ 13:40) (157/92 - 205/111)  RR:  (17 - 18)  SpO2:  (94% - 100%)  Wt(kg): --  GENERAL: NAD  EYES: No proptosis, no lid lag, anicteric  THYROID: No visible goiter  RESPIRATORY: Clear to auscultation bilaterally  CARDIOVASCULAR: Regular rate and rhythm  GI: Soft, nontender, non distended  EXT: b/l feet without wounds; warm and well perfused, no AZUL  PSYCH: Alert and oriented x 3, reactive mood  ======================================================                            13.2   11.01 )-----------( 305      ( 02 Aug 2024 07:23 )             38.2       08-02    138  |  99  |  16  ----------------------------<  205<H>  2.7<LL>   |  25  |  0.90    eGFR: 66    Ca    9.7      08-02  Mg     2.0     08-01  Phos  2.5     08-01    TPro  9.2<H>  /  Alb  4.2  /  TBili  0.6  /  DBili  x   /  AST  21  /  ALT  16  /  AlkPhos  122<H>  08-01      Thyroid Function Tests:  08-01 @ 14:53 TSH 1.06 FreeT4 -- T3 -- Anti TPO -- Anti Thyroglobulin Ab -- TSI --      A1C with Estimated Average Glucose Result: 10.7 % (08-01-24 @ 14:53)      08-02 Chol 219<H> Direct LDL -- LDL calculated 149<H> HDL 43<L> Trig 149    Radiology:

## 2024-08-02 NOTE — H&P ADULT - ATTENDING COMMENTS
74f with DM, not on meds here with 3 days of R facial palsy and dysarthria. CTH with L thalamocapsular hypodensity, possible L PICA infarct vs artifact. CTA with moderate proximal LM1 narrowing, also with possible L supraclinoid ICA aneurysm.  Initial NIHSS 2, MRS 0. Not tnk, thrombectomy candidate.   o/e with moderate R facial palsy, mild dysarthria     R facial palsy, dysarthria likely seconadry to L BG infarct - etiology small vessel vs possibly large artery atherosclerosis. Unclear nature of L PICA infarct vs artifact - will further clarify on MR  - aspirin 81 and plavix 75mg for now - duration to be determined pending further workup  - lipitor 80mg  - will start losartan for persistently elevated BPs, avoid hypotension  - glycemic control for elev a1c  - MRI brain   - TTE no bubble  - further workup pending MRI   - pt/ot/slp  - dvt ppx

## 2024-08-02 NOTE — PHYSICAL THERAPY INITIAL EVALUATION ADULT - PLANNED THERAPY INTERVENTIONS, PT EVAL
GOAL: Patient will be able to negotiate 4 steps in 2 weeks/bed mobility training/gait training/transfer training

## 2024-08-02 NOTE — CONSULT NOTE ADULT - ASSESSMENT
- Lantus 12 units SC QHS   - Admelog 4 units SC Premeal/TIDAC - hold if not eating or if npo   - Admelog Low Correction Scale Premeal & separate low Correction Scale Bedtime   - Goal -180  - FS Blood glucose monitoring Premeal/Bedtime   - Hypoglycemia protocol   - Carb Consistent Diet   - Nutrition consult   - Provider to RN for DM education   DC Planning:     HTN  Recommendations:   - outpt BP goal < 130/80   - defer to primary team   - outpatient annual urinary microalb/cr ratio    HLD  Recommendations:   - LDL goal < 70   - defer to primary team   - outpatient fasting lipid profile    Zuri Castillo is a 74-year-old woman with a past medical history of prediabetes who presented with 2 days of facial droop. Endocrinology consulted for newly diagnosed DMII.    #T2DM   A1c: 10.7, newly dx on admission  Home meds: none    - Lantus 12 units SC QHS   - Admelog 4 units SC Premeal/TIDAC - hold if not eating or if npo   - Admelog Low Correction Scale Premeal & separate low Correction Scale Bedtime   - Goal -180  - FS Blood glucose monitoring Premeal/Bedtime   - Hypoglycemia protocol   - Carb Consistent Diet   - Nutrition consult   - Provider to RN for DM education   DC Planning: TBD, but likely metformin and a GLP1, may consider basal insulin as well closer to discharge    #HTN  - on Losartan 25 mg inpatient  Recommendations:   - outpt BP goal < 130/80   - defer to primary team   - outpatient annual urinary microalb/cr ratio    #HLD  - On atorvastatin 80 mg inpatient  Recommendations:   - LDL goal < 55 due to high ASCVD risk   - defer to primary team   - outpatient fasting lipid profile    Zuri Castillo is a 74-year-old woman with a past medical history of prediabetes who presented with 2 days of facial droop. Endocrinology consulted for newly diagnosed DMII.  Patient is high risk with high level decision making due to uncontrolled diabetes with A1C>10 which places patient at high risk for cardiovascular and cerebrovascular events. Patient with lability of glucose requiring close monitoring and insulin adjustments.    #T2DM, uncontrolled   A1c: 10.7, newly dx on admission  Home meds: none  Recommendations:  - Lantus 12 units SC QHS   - Admelog 4 units SC Premeal/TIDAC - hold if not eating or if npo   - Admelog Low Correction Scale Premeal & separate low Correction Scale Bedtime   - Goal -180  - FS Blood glucose monitoring Premeal/Bedtime   - Hypoglycemia protocol   - Carb Consistent Diet   - Nutrition consult   - Provider to RN for DM education   DC Planning: TBD, but likely metformin and a GLP1, may consider basal insulin as well closer to discharge    #HTN  - on Losartan 25 mg inpatient  Recommendations:   - outpt BP goal < 130/80   - defer to primary team   - outpatient annual urinary microalb/cr ratio    #HLD  - On atorvastatin 80 mg inpatient  Recommendations:   - LDL goal < 55 due to high ASCVD risk   - defer to primary team   - outpatient fasting lipid profile

## 2024-08-02 NOTE — SPEECH LANGUAGE PATHOLOGY EVALUATION - SLP DIAGNOSIS
Pt is a 75 y/o female p/w mild dysarthria in setting of R facial droop and mild cognitive impairments remarkable for reduced organization, sequencing, and mental manipulation skills. Otherwise, receptive language, expressive language, reading, and writing WFL.

## 2024-08-02 NOTE — PHYSICAL THERAPY INITIAL EVALUATION ADULT - ADDITIONAL COMMENTS
Patient lives with her daughter in a private house with 3STE w/B/L handrails. PTA, pt. was independent in ADLs & mobility w/o AD; denies any hx of fall in the last one year

## 2024-08-02 NOTE — OCCUPATIONAL THERAPY INITIAL EVALUATION ADULT - PERTINENT HX OF CURRENT PROBLEM, REHAB EVAL
74-year old R handed woman, PMH of pre-diabetes, not on any medications, now presenting for R facial droop of 2 days presentation. Neurology team consulted for stroke evaluation. Noticed speech was altered, progressively worsened. Patient also reports slurred speech, feeling unsteady while walking, slowed gait. Slurred speech had progressively worsened, persistent, therefore presenting to the ED for further evaluation.  Denies swallowing issues, vision issues, hearing issues/ear pain/ringing in ears. States overall weakness from day prior. No recent illnesses, no ear pain. No change in taste. NIHSS 2 MRS 0 LKW 2 days prior to presentation, 7/29/24 /117 glucose 112 AC/AP: aspirin 81 daily (not prescribed) for few years duration. No prior similar episodes. No FH strokes. No other meds.  (8/1) XRAY Chest (-). CT HEAD: Focal hypodensity in the left basal ganglia and left cerebellum, in the absence of prior imaging for comparison, age indeterminant. Correlation with MRI to determine acuity is recommended. No intracranial hematoma or mass effect. CTA HEAD: No large vessel occlusion. Moderate luminal narrowing at the proximal left M1-MCA. Suggestion of tiny aneurysmal outpouching at the left supraclinoid ICA. No AVM. CTA NECK: No high-grade stenosis or occlusion in extracranial carotid and vertebral arteries. Coiling/looping of proximal right ICA, anatomical variation.

## 2024-08-02 NOTE — SPEECH LANGUAGE PATHOLOGY EVALUATION - COMMENTS
(8/1) XRAY Chest (-). CT HEAD: Focal hypodensity in the left basal ganglia and left cerebellum, in the absence of prior imaging for comparison, age indeterminant. Correlation with MRI to determine acuity is recommended. No intracranial hematoma or mass effect. CTA HEAD: No large vessel occlusion. Moderate luminal narrowing at the proximal left M1-MCA. Suggestion of tiny aneurysmal outpouching at the left supraclinoid ICA. No AVM. CTA NECK: No high-grade stenosis or occlusion in extracranial carotid and vertebral arteries. Coiling/looping of proximal right ICA, anatomical variation.    SWALLOW HISTORY: No reports in SCM or in PACS prior to this admission. Receptive language grossly intact for body part ID, discourse, R/L discrimination, follow multistep directives and answer open ended questions. Expressive language grossly intact. Speech is fluent, naming and repetition intact. Conversational speech WFL. Goals:  1. Pt will improve cognitive-linguistic skills for functional communication.   2. Pt will improve motor speech skills for functional communication. mental manipulation tasks of higher level tasks; impaired

## 2024-08-02 NOTE — OCCUPATIONAL THERAPY INITIAL EVALUATION ADULT - ADDITIONAL COMMENTS
pt lives with daughter in private home with 2-3 steps to enter and first floor set up, +tub. PTA pt was independent in ADLs and functional mobility with no device. pt owns no DME.

## 2024-08-03 LAB
GLUCOSE BLDC GLUCOMTR-MCNC: 121 MG/DL — HIGH (ref 70–99)
GLUCOSE BLDC GLUCOMTR-MCNC: 124 MG/DL — HIGH (ref 70–99)
GLUCOSE BLDC GLUCOMTR-MCNC: 193 MG/DL — HIGH (ref 70–99)
GLUCOSE BLDC GLUCOMTR-MCNC: 212 MG/DL — HIGH (ref 70–99)

## 2024-08-03 PROCEDURE — 99233 SBSQ HOSP IP/OBS HIGH 50: CPT

## 2024-08-03 RX ORDER — INSULIN LISPRO 100/ML
7 VIAL (ML) SUBCUTANEOUS
Refills: 0 | Status: DISCONTINUED | OUTPATIENT
Start: 2024-08-03 | End: 2024-08-04

## 2024-08-03 RX ORDER — INSULIN GLARGINE-YFGN 100 [IU]/ML
15 INJECTION, SOLUTION SUBCUTANEOUS AT BEDTIME
Refills: 0 | Status: DISCONTINUED | OUTPATIENT
Start: 2024-08-03 | End: 2024-08-04

## 2024-08-03 RX ADMIN — Medication 4 UNIT(S): at 12:00

## 2024-08-03 RX ADMIN — Medication 1: at 08:10

## 2024-08-03 RX ADMIN — LOSARTAN POTASSIUM 25 MILLIGRAM(S): 50 TABLET, FILM COATED ORAL at 05:12

## 2024-08-03 RX ADMIN — Medication 2: at 12:00

## 2024-08-03 RX ADMIN — Medication 81 MILLIGRAM(S): at 12:12

## 2024-08-03 RX ADMIN — Medication 4 UNIT(S): at 08:11

## 2024-08-03 RX ADMIN — CLOPIDOGREL BISULFATE 75 MILLIGRAM(S): 75 TABLET, FILM COATED ORAL at 12:12

## 2024-08-03 RX ADMIN — ATORVASTATIN CALCIUM 80 MILLIGRAM(S): 40 TABLET, FILM COATED ORAL at 21:21

## 2024-08-03 RX ADMIN — Medication 7 UNIT(S): at 16:26

## 2024-08-03 RX ADMIN — INSULIN GLARGINE-YFGN 15 UNIT(S): 100 INJECTION, SOLUTION SUBCUTANEOUS at 21:21

## 2024-08-03 NOTE — PROGRESS NOTE ADULT - SUBJECTIVE AND OBJECTIVE BOX
THE PATIENT WAS SEEN AND EXAMINED BY ME WITH THE HOUSESTAFF AND STROKE TEAM DURING MORNING ROUNDS.   HPI:  74-year old R handed woman, PMH of pre-diabetes, not on any medications, now presenting for R facial droop of 2 days presentation.    Neurology team consulted for stroke evaluation.  Noticed speech was altered, progressively worsened.     Patient also reports slurred speech, feeling unsteady while walking, slowed gait. Slurred speech had progressively worsened, persistent, therefore presenting to the ED for further evaluation.    Denies swallowing issues, vision issues, hearing issues/ear pain/ringing in ears.  States overall weakness from day prior.  No recent illnesses, no ear pain.   No change in taste.    NIHSS 2  MRS 0  LKW 2 days prior to presentation, 7/29/24  /117  glucose 112  AC/AP: aspirin 81 daily (not prescribed) for few years duration    No prior similar episodes.  No FH strokes.  No other meds (02 Aug 2024 01:04)      SUBJECTIVE: No events overnight.  No new neurologic complaints.      aspirin  chewable 81 milliGRAM(s) Oral daily  atorvastatin 80 milliGRAM(s) Oral at bedtime  clopidogrel Tablet 75 milliGRAM(s) Oral daily  dextrose 5%. 1000 milliLiter(s) IV Continuous <Continuous>  dextrose 5%. 1000 milliLiter(s) IV Continuous <Continuous>  dextrose 50% Injectable 12.5 Gram(s) IV Push once  dextrose 50% Injectable 25 Gram(s) IV Push once  dextrose 50% Injectable 25 Gram(s) IV Push once  dextrose Oral Gel 15 Gram(s) Oral once PRN  enoxaparin Injectable 40 milliGRAM(s) SubCutaneous every 24 hours  glucagon  Injectable 1 milliGRAM(s) IntraMuscular once  insulin glargine Injectable (LANTUS) 12 Unit(s) SubCutaneous at bedtime  insulin lispro (ADMELOG) corrective regimen sliding scale   SubCutaneous at bedtime  insulin lispro (ADMELOG) corrective regimen sliding scale   SubCutaneous three times a day before meals  insulin lispro Injectable (ADMELOG) 4 Unit(s) SubCutaneous three times a day before meals  losartan 25 milliGRAM(s) Oral daily      PHYSICAL EXAM:   Vital Signs Last 24 Hrs  T(C): 36.9 (03 Aug 2024 04:22), Max: 37.1 (02 Aug 2024 09:37)  T(F): 98.4 (03 Aug 2024 04:22), Max: 98.8 (02 Aug 2024 09:37)  HR: 91 (03 Aug 2024 04:22) (81 - 97)  BP: 170/98 (03 Aug 2024 04:22) (154/92 - 194/103)  BP(mean): --  RR: 18 (03 Aug 2024 04:22) (17 - 18)  SpO2: 97% (03 Aug 2024 04:22) (94% - 100%)    Parameters below as of 03 Aug 2024 04:22  Patient On (Oxygen Delivery Method): room air        General: No acute distress  HEENT: EOM intact, visual fields full  Abdomen: Soft, nontender, nondistended   Extremities: No edema    NEUROLOGICAL EXAM:  Mental status: Awake, alert, oriented x3, no aphasia, no neglect, normal memory   Cranial Nerves: No facial asymmetry, no nystagmus, no dysarthria,  tongue midline  Motor exam: Normal tone, no drift, 5/5 RUE, 5/5 RLE, 5/5 LUE, 5/5 LLE, normal fine finger movements.  Sensation: Intact to light touch   Coordination/ Gait: No dysmetria, SARANYA intact and symmetric bilaterally    LABS:                        13.2   11.01 )-----------( 305      ( 02 Aug 2024 07:23 )             38.2    08-02    135  |  101  |  21  ----------------------------<  317<H>  3.8   |  23  |  1.11    Ca    9.1      02 Aug 2024 22:15  Phos  2.5     08-01  Mg     2.0     08-01    TPro  9.2<H>  /  Alb  4.2  /  TBili  0.6  /  DBili  x   /  AST  21  /  ALT  16  /  AlkPhos  122<H>  08-01  PT/INR - ( 01 Aug 2024 14:53 )   PT: 11.7 sec;   INR: 1.07 ratio         PTT - ( 01 Aug 2024 14:53 )  PTT:29.2 sec      IMAGING: Reviewed by me.      THE PATIENT WAS SEEN AND EXAMINED BY ME WITH THE HOUSESTAFF AND STROKE TEAM DURING MORNING ROUNDS.   HPI:  74-year old R handed woman, PMH of pre-diabetes, not on any medications, now presenting for R facial droop of 2 days presentation.    Neurology team consulted for stroke evaluation.  Noticed speech was altered, progressively worsened.     Patient also reports slurred speech, feeling unsteady while walking, slowed gait. Slurred speech had progressively worsened, persistent, therefore presenting to the ED for further evaluation.    Denies swallowing issues, vision issues, hearing issues/ear pain/ringing in ears.  States overall weakness from day prior.  No recent illnesses, no ear pain.   No change in taste.    NIHSS 2  MRS 0  LKW 2 days prior to presentation, 7/29/24  /117  glucose 112  AC/AP: aspirin 81 daily (not prescribed) for few years duration    No prior similar episodes.  No FH strokes.  No other meds (02 Aug 2024 01:04)      SUBJECTIVE: No events overnight.  No new neurologic complaints.      aspirin  chewable 81 milliGRAM(s) Oral daily  atorvastatin 80 milliGRAM(s) Oral at bedtime  clopidogrel Tablet 75 milliGRAM(s) Oral daily  dextrose 5%. 1000 milliLiter(s) IV Continuous <Continuous>  dextrose 5%. 1000 milliLiter(s) IV Continuous <Continuous>  dextrose 50% Injectable 12.5 Gram(s) IV Push once  dextrose 50% Injectable 25 Gram(s) IV Push once  dextrose 50% Injectable 25 Gram(s) IV Push once  dextrose Oral Gel 15 Gram(s) Oral once PRN  enoxaparin Injectable 40 milliGRAM(s) SubCutaneous every 24 hours  glucagon  Injectable 1 milliGRAM(s) IntraMuscular once  insulin glargine Injectable (LANTUS) 12 Unit(s) SubCutaneous at bedtime  insulin lispro (ADMELOG) corrective regimen sliding scale   SubCutaneous three times a day before meals  insulin lispro (ADMELOG) corrective regimen sliding scale   SubCutaneous at bedtime  insulin lispro Injectable (ADMELOG) 4 Unit(s) SubCutaneous three times a day before meals  losartan 25 milliGRAM(s) Oral daily      PHYSICAL EXAM:   Vital Signs Last 24 Hrs  T(C): 36.9 (03 Aug 2024 04:22), Max: 37.1 (02 Aug 2024 09:37)  T(F): 98.4 (03 Aug 2024 04:22), Max: 98.8 (02 Aug 2024 09:37)  HR: 91 (03 Aug 2024 04:22) (81 - 97)  BP: 170/98 (03 Aug 2024 04:22) (154/92 - 194/103)  BP(mean): --  RR: 18 (03 Aug 2024 04:22) (17 - 18)  SpO2: 97% (03 Aug 2024 04:22) (94% - 100%)    Parameters below as of 03 Aug 2024 04:22  Patient On (Oxygen Delivery Method): room air        General: No acute distress  HEENT: EOM intact, visual fields full  Abdomen: Soft, nontender, nondistended   Extremities: No edema    NEUROLOGICAL EXAM:  Mental status: Awake, alert, oriented x3, no aphasia, no neglect, normal memory   Cranial Nerves: No facial asymmetry, no nystagmus, no dysarthria,  tongue midline  Motor exam: Normal tone, no drift, 5/5 RUE, 5/5 RLE, 5/5 LUE, 5/5 LLE, normal fine finger movements.  Sensation: Intact to light touch   Coordination/ Gait: No dysmetria, SARANYA intact and symmetric bilaterally    LABS:                        13.2   11.01 )-----------( 305      ( 02 Aug 2024 07:23 )             38.2    08-02    135  |  101  |  21  ----------------------------<  317<H>  3.8   |  23  |  1.11    Ca    9.1      02 Aug 2024 22:15  Phos  2.5     08-01  Mg     2.0     08-01    TPro  9.2<H>  /  Alb  4.2  /  TBili  0.6  /  DBili  x   /  AST  21  /  ALT  16  /  AlkPhos  122<H>  08-01  PT/INR - ( 01 Aug 2024 14:53 )   PT: 11.7 sec;   INR: 1.07 ratio         PTT - ( 01 Aug 2024 14:53 )  PTT:29.2 sec      IMAGING: Reviewed by me.      THE PATIENT WAS SEEN AND EXAMINED BY ME WITH THE HOUSESTAFF AND STROKE TEAM DURING MORNING ROUNDS.   HPI:  74-year old R handed woman, PMH of pre-diabetes, not on any medications, now presenting for R facial droop of 2 days presentation.    Neurology team consulted for stroke evaluation.  Noticed speech was altered, progressively worsened.     Patient also reports slurred speech, feeling unsteady while walking, slowed gait. Slurred speech had progressively worsened, persistent, therefore presenting to the ED for further evaluation.    Denies swallowing issues, vision issues, hearing issues/ear pain/ringing in ears.  States overall weakness from day prior.  No recent illnesses, no ear pain.   No change in taste.    NIHSS 2  MRS 0  LKW 2 days prior to presentation, 7/29/24  /117  glucose 112  AC/AP: aspirin 81 daily (not prescribed) for few years duration    No prior similar episodes.  No FH strokes.  No other meds (02 Aug 2024 01:04)    SUBJECTIVE: No events overnight.  No new neurologic complaints.      aspirin  chewable 81 milliGRAM(s) Oral daily  atorvastatin 80 milliGRAM(s) Oral at bedtime  clopidogrel Tablet 75 milliGRAM(s) Oral daily  dextrose 5%. 1000 milliLiter(s) IV Continuous <Continuous>  dextrose 5%. 1000 milliLiter(s) IV Continuous <Continuous>  dextrose 50% Injectable 12.5 Gram(s) IV Push once  dextrose 50% Injectable 25 Gram(s) IV Push once  dextrose 50% Injectable 25 Gram(s) IV Push once  dextrose Oral Gel 15 Gram(s) Oral once PRN  enoxaparin Injectable 40 milliGRAM(s) SubCutaneous every 24 hours  glucagon  Injectable 1 milliGRAM(s) IntraMuscular once  insulin glargine Injectable (LANTUS) 12 Unit(s) SubCutaneous at bedtime  insulin lispro (ADMELOG) corrective regimen sliding scale   SubCutaneous three times a day before meals  insulin lispro (ADMELOG) corrective regimen sliding scale   SubCutaneous at bedtime  insulin lispro Injectable (ADMELOG) 4 Unit(s) SubCutaneous three times a day before meals  losartan 25 milliGRAM(s) Oral daily      PHYSICAL EXAM:   Vital Signs Last 24 Hrs  T(C): 36.9 (03 Aug 2024 04:22), Max: 37.1 (02 Aug 2024 09:37)  T(F): 98.4 (03 Aug 2024 04:22), Max: 98.8 (02 Aug 2024 09:37)  HR: 91 (03 Aug 2024 04:22) (81 - 97)  BP: 170/98 (03 Aug 2024 04:22) (154/92 - 194/103)  BP(mean): --  RR: 18 (03 Aug 2024 04:22) (17 - 18)  SpO2: 97% (03 Aug 2024 04:22) (94% - 100%)    Parameters below as of 03 Aug 2024 04:22  Patient On (Oxygen Delivery Method): room air      General: No acute distress  HEENT: EOM intact, visual fields full    NEUROLOGICAL EXAM:  Mental status: Awake, alert, oriented x3, no aphasia, no neglect, normal memory   Cranial Nerves: No facial asymmetry, no nystagmus, no dysarthria,  tongue midline  Motor exam: Normal tone, no drift, 5/5 RUE, 5/5 RLE, 5/5 LUE, 5/5 LLE, normal fine finger movements.  Sensation: Intact to light touch   Coordination/ Gait: No dysmetria, SARANYA intact and symmetric bilaterally    LABS:                        13.2   11.01 )-----------( 305      ( 02 Aug 2024 07:23 )             38.2    08-02    135  |  101  |  21  ----------------------------<  317<H>  3.8   |  23  |  1.11    Ca    9.1      02 Aug 2024 22:15  Phos  2.5     08-01  Mg     2.0     08-01    TPro  9.2<H>  /  Alb  4.2  /  TBili  0.6  /  DBili  x   /  AST  21  /  ALT  16  /  AlkPhos  122<H>  08-01  PT/INR - ( 01 Aug 2024 14:53 )   PT: 11.7 sec;   INR: 1.07 ratio         PTT - ( 01 Aug 2024 14:53 )  PTT:29.2 sec      IMAGING  MRI brain w/o 8/2/2024  Abnormal T2 prolongation restricted diffusion is seen involving the posterior limb of the left internal capsule. This is compatible with an acute infarct. No hemorrhagic transformation is seen. No significant shift or herniation is seen.    CTH  Focal hypodensity in the left basal ganglia and left cerebellum, in the absence of prior imaging for comparison, age indeterminant. Correlation with MRI to determine acuity is recommended.  No intracranial hematoma or mass effect.    CTA HEAD:  No large vessel occlusion. Moderate luminal narrowing at the proximal left M1-MCA. Suggestion of tiny aneurysmal outpouching at the left supraclinoid ICA. No AVM.    CTA NECK:  No high-grade stenosis or occlusion in extracranial carotid and vertebral arteries.  Coiling/looping of proximal right ICA, anatomical variation.     THE PATIENT WAS SEEN AND EXAMINED BY ME WITH THE HOUSESTAFF AND STROKE TEAM DURING MORNING ROUNDS.   HPI:  74-year old R handed woman, PMH of pre-diabetes, not on any medications, originally presented with right facial droop, worsening slurred speech.    NIHSS 2  MRS 0  LKW 2 days prior to presentation, 7/29/24  /117  glucose 112  AC/AP: aspirin 81 daily (not prescribed) for few years duration    No prior similar episodes.  No FH strokes.  No other meds (02 Aug 2024 01:04)    SUBJECTIVE: No events overnight.  No new neurologic complaints.      aspirin  chewable 81 milliGRAM(s) Oral daily  atorvastatin 80 milliGRAM(s) Oral at bedtime  clopidogrel Tablet 75 milliGRAM(s) Oral daily  dextrose 5%. 1000 milliLiter(s) IV Continuous <Continuous>  dextrose 5%. 1000 milliLiter(s) IV Continuous <Continuous>  dextrose 50% Injectable 12.5 Gram(s) IV Push once  dextrose 50% Injectable 25 Gram(s) IV Push once  dextrose 50% Injectable 25 Gram(s) IV Push once  dextrose Oral Gel 15 Gram(s) Oral once PRN  enoxaparin Injectable 40 milliGRAM(s) SubCutaneous every 24 hours  glucagon  Injectable 1 milliGRAM(s) IntraMuscular once  insulin glargine Injectable (LANTUS) 12 Unit(s) SubCutaneous at bedtime  insulin lispro (ADMELOG) corrective regimen sliding scale   SubCutaneous three times a day before meals  insulin lispro (ADMELOG) corrective regimen sliding scale   SubCutaneous at bedtime  insulin lispro Injectable (ADMELOG) 4 Unit(s) SubCutaneous three times a day before meals  losartan 25 milliGRAM(s) Oral daily      PHYSICAL EXAM:   Vital Signs Last 24 Hrs  T(C): 36.9 (03 Aug 2024 04:22), Max: 37.1 (02 Aug 2024 09:37)  T(F): 98.4 (03 Aug 2024 04:22), Max: 98.8 (02 Aug 2024 09:37)  HR: 91 (03 Aug 2024 04:22) (81 - 97)  BP: 170/98 (03 Aug 2024 04:22) (154/92 - 194/103)  BP(mean): --  RR: 18 (03 Aug 2024 04:22) (17 - 18)  SpO2: 97% (03 Aug 2024 04:22) (94% - 100%)    Parameters below as of 03 Aug 2024 04:22  Patient On (Oxygen Delivery Method): room air      General: No acute distress  HEENT: EOM intact, visual fields full    NEUROLOGICAL EXAM:  Mental status: Awake, alert, oriented x3( name, month year, state), speech is slurred but can identify and repeat  Cranial Nerves: right facial droop no nystagmus,   tongue midline  Motor exam: Normal tone, no drift, 5/5 RUE, 4+/5 RLE, 5/5 LUE, 5/5 LLE, normal fine finger movements.  Sensation: Intact to light touch   Coordination/ Gait: No dysmetria, SARANYA intact and symmetric bilaterally    LABS:                        13.2   11.01 )-----------( 305      ( 02 Aug 2024 07:23 )             38.2    08-02    135  |  101  |  21  ----------------------------<  317<H>  3.8   |  23  |  1.11    Ca    9.1      02 Aug 2024 22:15  Phos  2.5     08-01  Mg     2.0     08-01    TPro  9.2<H>  /  Alb  4.2  /  TBili  0.6  /  DBili  x   /  AST  21  /  ALT  16  /  AlkPhos  122<H>  08-01  PT/INR - ( 01 Aug 2024 14:53 )   PT: 11.7 sec;   INR: 1.07 ratio         PTT - ( 01 Aug 2024 14:53 )  PTT:29.2 sec      IMAGING  MRI brain w/o 8/2/2024  Abnormal T2 prolongation restricted diffusion is seen involving the posterior limb of the left internal capsule. This is compatible with an acute infarct. No hemorrhagic transformation is seen. No significant shift or herniation is seen.    CTH  Focal hypodensity in the left basal ganglia and left cerebellum, in the absence of prior imaging for comparison, age indeterminant. Correlation with MRI to determine acuity is recommended.  No intracranial hematoma or mass effect.    CTA HEAD:  No large vessel occlusion. Moderate luminal narrowing at the proximal left M1-MCA. Suggestion of tiny aneurysmal outpouching at the left supraclinoid ICA. No AVM.    CTA NECK:  No high-grade stenosis or occlusion in extracranial carotid and vertebral arteries.  Coiling/looping of proximal right ICA, anatomical variation.     THE PATIENT WAS SEEN AND EXAMINED BY ME WITH THE HOUSESTAFF AND STROKE TEAM DURING MORNING ROUNDS.     HPI:  74-year old R handed woman, PMH of pre-diabetes, not on any medications, now presenting for R facial droop of 2 days presentation. Noticed speech was altered, progressively worsened.   Patient also reports slurred speech, feeling unsteady while walking, slowed gait. Slurred speech had progressively worsened, persistent, therefore presenting to the ED for further evaluation.    Denies swallowing issues, vision issues, hearing issues/ear pain/ringing in ears.  States overall weakness from day prior.    NIHSS 2  MRS 0  LKW 2 days prior to presentation, 7/29/24  /117  glucose 112  AC/AP: aspirin 81 daily (not prescribed) for few years duration    SUBJECTIVE: No events overnight.  No new neurologic complaints.      aspirin  chewable 81 milliGRAM(s) Oral daily  atorvastatin 80 milliGRAM(s) Oral at bedtime  clopidogrel Tablet 75 milliGRAM(s) Oral daily  dextrose 5%. 1000 milliLiter(s) IV Continuous <Continuous>  dextrose 5%. 1000 milliLiter(s) IV Continuous <Continuous>  dextrose 50% Injectable 12.5 Gram(s) IV Push once  dextrose 50% Injectable 25 Gram(s) IV Push once  dextrose 50% Injectable 25 Gram(s) IV Push once  dextrose Oral Gel 15 Gram(s) Oral once PRN  enoxaparin Injectable 40 milliGRAM(s) SubCutaneous every 24 hours  glucagon  Injectable 1 milliGRAM(s) IntraMuscular once  insulin glargine Injectable (LANTUS) 12 Unit(s) SubCutaneous at bedtime  insulin lispro (ADMELOG) corrective regimen sliding scale   SubCutaneous three times a day before meals  insulin lispro (ADMELOG) corrective regimen sliding scale   SubCutaneous at bedtime  insulin lispro Injectable (ADMELOG) 4 Unit(s) SubCutaneous three times a day before meals  losartan 25 milliGRAM(s) Oral daily      PHYSICAL EXAM:   Vital Signs Last 24 Hrs  T(C): 36.9 (03 Aug 2024 04:22), Max: 37.1 (02 Aug 2024 09:37)  T(F): 98.4 (03 Aug 2024 04:22), Max: 98.8 (02 Aug 2024 09:37)  HR: 91 (03 Aug 2024 04:22) (81 - 97)  BP: 170/98 (03 Aug 2024 04:22) (154/92 - 194/103)  RR: 18 (03 Aug 2024 04:22) (17 - 18)  SpO2: 97% (03 Aug 2024 04:22) (94% - 100%)    Parameters below as of 03 Aug 2024 04:22  Patient On (Oxygen Delivery Method): room air    General: No acute distress  HEENT: EOM intact, visual fields full    NEUROLOGICAL EXAM:  Mental status: Awake, alert, oriented x3( name, month year, state), speech is slurred but can identify and repeat  Cranial Nerves: right facial droop no nystagmus,   tongue midline  Motor exam: Normal tone, no drift, 5/5 RUE, 4+/5 RLE, 5/5 LUE, 5/5 LLE, normal fine finger movements.  Sensation: Intact to light touch   Coordination/ Gait: No dysmetria, SARANYA intact and symmetric bilaterally      Physical Examination:   General - non-toxic appearing male/female in no acute distress  Neurologic Exam:  Mental status - Awake, Alert, Oriented to person, place, and time. Speech dysarthric, repetition and naming intact. Follows simple commands.  Cranial nerves - PERRLA (4mm -> 3mm b/l), VFF, EOMI, face sensation (V1-V3) intact b/l, facial strength R facial droop and asymmetry upon smile, hearing intact b/l, palate with symmetric elevation,  sternocleidomastiod 5/5 strength b/l, tongue slightly towards R side on protrusion with full lateral movement  Motor - Normal bulk and tone throughout. No pronator drift.  Strength testing 5/5 b/l UE, LE  Sensation - Light touch intact throughout  Coordination - Finger to Nose intact b/l. Heel-shin intact b/l      LABS:                        13.2   11.01 )-----------( 305      ( 02 Aug 2024 07:23 )             38.2    08-02    135  |  101  |  21  ----------------------------<  317<H>  3.8   |  23  |  1.11    Ca    9.1      02 Aug 2024 22:15  Phos  2.5     08-01  Mg     2.0     08-01    TPro  9.2<H>  /  Alb  4.2  /  TBili  0.6  /  DBili  x   /  AST  21  /  ALT  16  /  AlkPhos  122<H>  08-01  PT/INR - ( 01 Aug 2024 14:53 )   PT: 11.7 sec;   INR: 1.07 ratio         PTT - ( 01 Aug 2024 14:53 )  PTT:29.2 sec    IMAGING:     MRI brain w/o 8/2/2024:   Abnormal T2 prolongation restricted diffusion is seen involving the posterior limb of the left internal capsule. This is compatible with an acute infarct. No hemorrhagic transformation is seen. No significant shift or herniation is seen.    8/1/24 CTH:   Focal hypodensity in the left basal ganglia and left cerebellum, in the absence of prior imaging for comparison, age indeterminant. Correlation with MRI to determine acuity is recommended.  No intracranial hematoma or mass effect.    CTA HEAD:  No large vessel occlusion. Moderate luminal narrowing at the proximal left M1-MCA. Suggestion of tiny aneurysmal outpouching at the left supraclinoid ICA. No AVM.    CTA NECK:  No high-grade stenosis or occlusion in extracranial carotid and vertebral arteries.  Coiling/looping of proximal right ICA, anatomical variation.     THE PATIENT WAS SEEN AND EXAMINED BY ME WITH THE HOUSESTAFF AND STROKE TEAM DURING MORNING ROUNDS.     HPI:  74-year old R handed woman, PMH of pre-diabetes, not on any medications, now presenting for R facial droop of 2 days presentation. Noticed speech was altered, progressively worsened.   Patient also reports slurred speech, feeling unsteady while walking, slowed gait. Slurred speech had progressively worsened, persistent, therefore presenting to the ED for further evaluation.    Denies swallowing issues, vision issues, hearing issues/ear pain/ringing in ears.  States overall weakness from day prior.    NIHSS 2  MRS 0  LKW 2 days prior to presentation, 7/29/24  /117  glucose 112  AC/AP: aspirin 81 daily (not prescribed) for few years duration    SUBJECTIVE: No events overnight.  No new neurologic complaints.      aspirin  chewable 81 milliGRAM(s) Oral daily  atorvastatin 80 milliGRAM(s) Oral at bedtime  clopidogrel Tablet 75 milliGRAM(s) Oral daily  dextrose 5%. 1000 milliLiter(s) IV Continuous <Continuous>  dextrose 5%. 1000 milliLiter(s) IV Continuous <Continuous>  dextrose 50% Injectable 12.5 Gram(s) IV Push once  dextrose 50% Injectable 25 Gram(s) IV Push once  dextrose 50% Injectable 25 Gram(s) IV Push once  dextrose Oral Gel 15 Gram(s) Oral once PRN  enoxaparin Injectable 40 milliGRAM(s) SubCutaneous every 24 hours  glucagon  Injectable 1 milliGRAM(s) IntraMuscular once  insulin glargine Injectable (LANTUS) 12 Unit(s) SubCutaneous at bedtime  insulin lispro (ADMELOG) corrective regimen sliding scale   SubCutaneous three times a day before meals  insulin lispro (ADMELOG) corrective regimen sliding scale   SubCutaneous at bedtime  insulin lispro Injectable (ADMELOG) 4 Unit(s) SubCutaneous three times a day before meals  losartan 25 milliGRAM(s) Oral daily      PHYSICAL EXAM:   Vital Signs Last 24 Hrs  T(C): 36.9 (03 Aug 2024 04:22), Max: 37.1 (02 Aug 2024 09:37)  T(F): 98.4 (03 Aug 2024 04:22), Max: 98.8 (02 Aug 2024 09:37)  HR: 91 (03 Aug 2024 04:22) (81 - 97)  BP: 170/98 (03 Aug 2024 04:22) (154/92 - 194/103)  RR: 18 (03 Aug 2024 04:22) (17 - 18)  SpO2: 97% (03 Aug 2024 04:22) (94% - 100%)    Parameters below as of 03 Aug 2024 04:22  Patient On (Oxygen Delivery Method): room air    General: No acute distress  HEENT: EOM intact, visual fields full  Abdomen: Soft, nontender, nondistended   Extremities: No edema    NEUROLOGICAL EXAM:  Mental status: Awake, Alert, Oriented to person, place, and time. Speech dysarthric, repetition and naming intact. Follows simple commands.  Cranial Nerves: right facial droop no nystagmus,   tongue midline  Motor exam: Normal tone, no drift, 5/5 RUE, 4+/5 RLE, 5/5 LUE, 5/5 LLE, normal fine finger movements.  Sensation: Intact to light touch   Coordination/ Gait: No dysmetria, SARANYA intact and symmetric bilaterally      Physical Examination:   General - non-toxic appearing male/female in no acute distress  Neurologic Exam:  Mental status - Cranial nerves - PERRLA (4mm -> 3mm b/l), VFF, EOMI, face sensation (V1-V3) intact b/l, facial strength R facial droop and asymmetry upon smile, hearing intact b/l, palate with symmetric elevation,  sternocleidomastiod 5/5 strength b/l, tongue slightly towards R side on protrusion with full lateral movement  Motor - Normal bulk and tone throughout. No pronator drift.  Strength testing 5/5 b/l UE, LE  Sensation - Light touch intact throughout  Coordination - Finger to Nose intact b/l. Heel-shin intact b/l      LABS:                        13.2   11.01 )-----------( 305      ( 02 Aug 2024 07:23 )             38.2    08-02    135  |  101  |  21  ----------------------------<  317<H>  3.8   |  23  |  1.11    Ca    9.1      02 Aug 2024 22:15  Phos  2.5     08-01  Mg     2.0     08-01    TPro  9.2<H>  /  Alb  4.2  /  TBili  0.6  /  DBili  x   /  AST  21  /  ALT  16  /  AlkPhos  122<H>  08-01  PT/INR - ( 01 Aug 2024 14:53 )   PT: 11.7 sec;   INR: 1.07 ratio         PTT - ( 01 Aug 2024 14:53 )  PTT:29.2 sec    IMAGING:     MRI brain w/o 8/2/2024:   Abnormal T2 prolongation restricted diffusion is seen involving the posterior limb of the left internal capsule. This is compatible with an acute infarct. No hemorrhagic transformation is seen. No significant shift or herniation is seen.    8/1/24 CTH:   Focal hypodensity in the left basal ganglia and left cerebellum, in the absence of prior imaging for comparison, age indeterminant. Correlation with MRI to determine acuity is recommended.  No intracranial hematoma or mass effect.    CTA HEAD:  No large vessel occlusion. Moderate luminal narrowing at the proximal left M1-MCA. Suggestion of tiny aneurysmal outpouching at the left supraclinoid ICA. No AVM.    CTA NECK:  No high-grade stenosis or occlusion in extracranial carotid and vertebral arteries.  Coiling/looping of proximal right ICA, anatomical variation.     THE PATIENT WAS SEEN AND EXAMINED BY ME WITH THE HOUSESTAFF AND STROKE TEAM DURING MORNING ROUNDS.     HPI:  74-year old R handed woman, PMH of pre-diabetes, not on any medications, now presenting for R facial droop of 2 days presentation. Noticed speech was altered, progressively worsened.   Patient also reports slurred speech, feeling unsteady while walking, slowed gait. Slurred speech had progressively worsened, persistent, therefore presenting to the ED for further evaluation.    Denies swallowing issues, vision issues, hearing issues/ear pain/ringing in ears.  States overall weakness from day prior.    NIHSS 2  MRS 0  LKW 2 days prior to presentation, 7/29/24  /117  glucose 112  AC/AP: aspirin 81 daily (not prescribed) for few years duration    SUBJECTIVE: No events overnight.  No new neurologic complaints.      aspirin  chewable 81 milliGRAM(s) Oral daily  atorvastatin 80 milliGRAM(s) Oral at bedtime  clopidogrel Tablet 75 milliGRAM(s) Oral daily  dextrose 5%. 1000 milliLiter(s) IV Continuous <Continuous>  dextrose 5%. 1000 milliLiter(s) IV Continuous <Continuous>  dextrose 50% Injectable 12.5 Gram(s) IV Push once  dextrose 50% Injectable 25 Gram(s) IV Push once  dextrose 50% Injectable 25 Gram(s) IV Push once  dextrose Oral Gel 15 Gram(s) Oral once PRN  enoxaparin Injectable 40 milliGRAM(s) SubCutaneous every 24 hours  glucagon  Injectable 1 milliGRAM(s) IntraMuscular once  insulin glargine Injectable (LANTUS) 12 Unit(s) SubCutaneous at bedtime  insulin lispro (ADMELOG) corrective regimen sliding scale   SubCutaneous three times a day before meals  insulin lispro (ADMELOG) corrective regimen sliding scale   SubCutaneous at bedtime  insulin lispro Injectable (ADMELOG) 4 Unit(s) SubCutaneous three times a day before meals  losartan 25 milliGRAM(s) Oral daily      PHYSICAL EXAM:   Vital Signs Last 24 Hrs  T(C): 36.9 (03 Aug 2024 04:22), Max: 37.1 (02 Aug 2024 09:37)  T(F): 98.4 (03 Aug 2024 04:22), Max: 98.8 (02 Aug 2024 09:37)  HR: 91 (03 Aug 2024 04:22) (81 - 97)  BP: 170/98 (03 Aug 2024 04:22) (154/92 - 194/103)  RR: 18 (03 Aug 2024 04:22) (17 - 18)  SpO2: 97% (03 Aug 2024 04:22) (94% - 100%)    Parameters below as of 03 Aug 2024 04:22  Patient On (Oxygen Delivery Method): room air    General: No acute distress  HEENT: EOM intact, visual fields full  Abdomen: Soft, nontender, nondistended   Extremities: No edema    NEUROLOGICAL EXAM:  Mental status: Awake, Alert, Oriented to person, place, and time. Speech dysarthric, repetition and naming intact. Follows simple commands.  Cranial Nerves: Right facial droop no nystagmus, tongue midline  Motor exam: Normal tone, no drift, 5/5 RUE, 4+/5 RLE, 5/5 LUE, 5/5 LLE, No dysmetria.   Sensation: Intact to light touch   Gait: Not assessed at this time     LABS:                        13.2   11.01 )-----------( 305      ( 02 Aug 2024 07:23 )             38.2    08-02    135  |  101  |  21  ----------------------------<  317<H>  3.8   |  23  |  1.11    Ca    9.1      02 Aug 2024 22:15  Phos  2.5     08-01  Mg     2.0     08-01    TPro  9.2<H>  /  Alb  4.2  /  TBili  0.6  /  DBili  x   /  AST  21  /  ALT  16  /  AlkPhos  122<H>  08-01  PT/INR - ( 01 Aug 2024 14:53 )   PT: 11.7 sec;   INR: 1.07 ratio         PTT - ( 01 Aug 2024 14:53 )  PTT:29.2 sec    IMAGING:     MRI brain w/o 8/2/2024:   Abnormal T2 prolongation restricted diffusion is seen involving the posterior limb of the left internal capsule. This is compatible with an acute infarct. No hemorrhagic transformation is seen. No significant shift or herniation is seen.    8/1/24 CTH:   Focal hypodensity in the left basal ganglia and left cerebellum, in the absence of prior imaging for comparison, age indeterminant. Correlation with MRI to determine acuity is recommended.  No intracranial hematoma or mass effect.    CTA HEAD:  No large vessel occlusion. Moderate luminal narrowing at the proximal left M1-MCA. Suggestion of tiny aneurysmal outpouching at the left supraclinoid ICA. No AVM.    CTA NECK:  No high-grade stenosis or occlusion in extracranial carotid and vertebral arteries.  Coiling/looping of proximal right ICA, anatomical variation.

## 2024-08-03 NOTE — PROGRESS NOTE ADULT - SUBJECTIVE AND OBJECTIVE BOX
INTERVAL HPI/OVERNIGHT EVENTS:  Seen at the bedside. Tolerating diet well, denies nausea or vomiting.        Review of systems:   CONSTITUTIONAL:  Feels well, good appetite  CARDIOVASCULAR:  Negative for chest pain or palpitations  RESPIRATORY:  Negative for cough, or SOB   GASTROINTESTINAL:  Negative for nausea, vomiting, or abdominal pain  GENITOURINARY:  Negative frequency, urgency or dysuria     CAPILLARY BLOOD GLUCOSE      POCT Blood Glucose.: 212 mg/dL (03 Aug 2024 11:29)  POCT Blood Glucose.: 193 mg/dL (03 Aug 2024 07:45)  POCT Blood Glucose.: 281 mg/dL (02 Aug 2024 22:07)  POCT Blood Glucose.: 291 mg/dL (02 Aug 2024 17:02)       MEDICATIONS  (STANDING):  aspirin  chewable 81 milliGRAM(s) Oral daily  atorvastatin 80 milliGRAM(s) Oral at bedtime  clopidogrel Tablet 75 milliGRAM(s) Oral daily  dextrose 5%. 1000 milliLiter(s) (100 mL/Hr) IV Continuous <Continuous>  dextrose 5%. 1000 milliLiter(s) (50 mL/Hr) IV Continuous <Continuous>  dextrose 50% Injectable 12.5 Gram(s) IV Push once  dextrose 50% Injectable 25 Gram(s) IV Push once  dextrose 50% Injectable 25 Gram(s) IV Push once  enoxaparin Injectable 40 milliGRAM(s) SubCutaneous every 24 hours  glucagon  Injectable 1 milliGRAM(s) IntraMuscular once  insulin glargine Injectable (LANTUS) 15 Unit(s) SubCutaneous at bedtime  insulin lispro (ADMELOG) corrective regimen sliding scale   SubCutaneous three times a day before meals  insulin lispro (ADMELOG) corrective regimen sliding scale   SubCutaneous at bedtime  insulin lispro Injectable (ADMELOG) 7 Unit(s) SubCutaneous three times a day before meals  losartan 25 milliGRAM(s) Oral daily    MEDICATIONS  (PRN):  dextrose Oral Gel 15 Gram(s) Oral once PRN Blood Glucose LESS THAN 70 milliGRAM(s)/deciliter      PHYSICAL EXAM  Vital Signs Last 24 Hrs  T(C): 36.7 (03 Aug 2024 14:22), Max: 37.1 (03 Aug 2024 09:56)  T(F): 98 (03 Aug 2024 14:22), Max: 98.7 (03 Aug 2024 09:56)  HR: 80 (03 Aug 2024 14:22) (80 - 98)  BP: 169/98 (03 Aug 2024 14:22) (154/92 - 194/103)  BP(mean): --  RR: 18 (03 Aug 2024 14:22) (18 - 18)  SpO2: 98% (03 Aug 2024 14:22) (97% - 100%)    Parameters below as of 03 Aug 2024 14:22  Patient On (Oxygen Delivery Method): room air        GENERAL: feMale laying in bed in NAD  RESPIRATORY: nonlabored breathing, no accessory muscle use  Extremities: Warm, no edema in all 4 exts   NEURO: A&O X3    LABS:                        13.2   11.01 )-----------( 305      ( 02 Aug 2024 07:23 )             38.2     08-02    135  |  101  |  21  ----------------------------<  317<H>  3.8   |  23  |  1.11    Ca    9.1      02 Aug 2024 22:15        Urinalysis Basic - ( 02 Aug 2024 22:15 )    Color: x / Appearance: x / SG: x / pH: x  Gluc: 317 mg/dL / Ketone: x  / Bili: x / Urobili: x   Blood: x / Protein: x / Nitrite: x   Leuk Esterase: x / RBC: x / WBC x   Sq Epi: x / Non Sq Epi: x / Bacteria: x      Thyroid Stimulating Hormone, Serum: 1.06 uIU/mL (08-01 @ 14:53)

## 2024-08-03 NOTE — PROGRESS NOTE ADULT - ASSESSMENT
74-year old R handed woman, PMH of pre-diabetes, not on any medications, now presenting for R facial droop of 2 days presentation. CT head showing focal hypodensity in L basal ganglia, L cerebellum. CTA demonstrating no LVO, moderate luminal narrowing proximal L M1-MCA.   NIHSS 2  MRS 0  LKW 2 days prior to presentation 7/29/24   /117  glucose 112  AC/AP: aspirin 81 daily (not prescribed) for few years duration    IMPRESSION   R facial droop and dysarthria progressively worsening c/f L brain dysfunction, CT head showing L hypodensity in basal ganglia + L cerebellum. Pending further workup.   ASSESSMENT:     NEURO: Continue close monitoring for neurologic deterioration, permissive HTN, titrate statin to LDL goal less than 70, MRI Brain w/o, MRA Head w/o and Neck w/contrast. Physical therapy/OT/Speech eval/treatment.     ANTITHROMBOTIC THERAPY:     PULMONARY: CXR clear, protecting airway, saturating well     CARDIOVASCULAR: check TTE, cardiac monitoring                              SBP goal:     GASTROINTESTINAL:  dysphagia screen       Diet:     RENAL: BUN/Cr stable, good urine output      Na Goal: Greater than 135     Dickson:    HEMATOLOGY: H/H stable, Platelets normal      DVT ppx: Heparin s.c [] LMWH []     ID: afebrile, no leukocytosis     OTHER:     DISPOSITION: Rehab or home depending on PT eval once stable and workup is complete      CORE MEASURES:        Admission NIHSS:      TPA: [] YES [] NO      LDL/HDL:     Depression Screen:      Statin Therapy:     Dysphagia Screen: [] PASS [] FAIL     Smoking [] YES [] NO      Afib [] YES [] NO     Stroke Education [] YES [] NO    Obtain screening lower extremity venous ultrasound in patients who meet 1 or more of the following criteria as patient is high risk for DVT/PE on admission:   [] History of DVT/PE  []Hypercoagulable states (Factor V Leiden, Cancer, OCP, etc. )  []Prolonged immobility (hemiplegia/hemiparesis/post operative or any other extended immobilization)  [] Transferred from outside facility (Rehab or Long term care)  [] Age </= to 50 74-year old R handed woman, PMH of diabetes, not on any medications, originally presented with right facial droop, worsening slurred speech. Found to have posterior limb of the left internal capsule acute ischemic infarct. Neuro exam pertinent for right facial droop, weakness in RUE, dysarthric speech. Patient on aspirin and plavix. Patient being seen by endocrinology to help manage diabetes.    Impression: right facial droop, RUE weakness, dysarthria 2/2 left brain dysfunction due to acute ischemic stroke in left internal capsule. Mechanism likely small vessel disease    Neuro  aspirin 81mg and plavix 75mg for 21 days followed by aspirin 81mg daily indefinitely   A1c 10.7, endocrine following and recs appreciated  , continue atorvastatin 80mg daily  follow up TTE  reviewed CT, CTA, MRI brain    CARDIOVASCULAR: check TTE, cardiac monitoring                              SBP goal: gradual normotension    GASTROINTESTINAL:      Diet:  diabetic diet    Endocrinology  - Lantus 12 units SC QHS   - Admelog 4 units SC Premeal/TIDAC - hold if not eating or if npo   - Admelog Low Correction Scale Premeal & separate low Correction Scale Bedtime   - Goal -180  - FS Blood glucose monitoring Premeal/Bedtime   - Hypoglycemia protocol   - Carb Consistent Diet   - Nutrition consult    RENAL: BUN/Cr stable, good urine output      Na Goal: Greater than 135    HEMATOLOGY: H/H stable, Platelets normal      DVT ppx: lovenox 40mg daily     ID: afebrile, no leukocytosis     DISPOSITION: PT/OT recs home with outpatient PT    Case seen and discussed with Neurology Attending Dr. Fenton    74-year old R handed woman, PMH of diabetes, not on any medications, originally presented with right facial droop, worsening slurred speech. Found to have posterior limb of the left internal capsule acute ischemic infarct. Neuro exam pertinent for right facial droop, weakness in RUE, dysarthric speech.     IMPRESSION: Right facial droop, RUE weakness, dysarthria 2/2 left brain dysfunction due to acute ischemic stroke in left internal capsule. Mechanism likely small vessel disease.     NEURO: Neurologically improved since admission. Continue close monitoring for neurologic deterioration. Titrate statin to LDL goal less than 70. MRI Brain w/o, MRA Head w/o and Neck w/contrast as above. Physical therapy/OT: Home PT/OT     ANTITHROMBOTIC THERAPY: ASA and Plavix      PULMONARY: CXR clear, protecting airway, saturating well     CARDIOVASCULAR: TTE pending. Continue cardiac monitoring. Started on Losartan 25mg Daily                        SBP goal: 140-160s    GASTROINTESTINAL: Passed dysphagia screen       Diet: Regular     RENAL: BUN/Cr stable, good urine output      Na Goal: Greater than 135     Dickson: NO     HEMATOLOGY: H/H stable, Platelets normal      DVT ppx: Lovenox     ID: afebrile, no leukocytosis     OTHER: Patient and family updated at bedside, all questions answered. Endocrinology following, A1C: 10.7    DISPOSITION: Outpatient Home PT/OT once stable and workup is complete    CORE MEASURES:        Admission NIHSS: 2     TPA: NO     LDL/HDL: 149/43     Depression Screen: Pending      Statin Therapy: YES      Dysphagia Screen: PASS      Smoking: NO     AFIB: NO      Stroke Education: YES     Obtain screening lower extremity venous ultrasound in patients who meet 1 or more of the following criteria as patient is high risk for DVT/PE on admission:   [] History of DVT/PE  [] Hypercoagulable states (Factor V Leiden, Cancer, OCP, etc. )  [] Prolonged immobility (hemiplegia/hemiparesis/post operative or any other extended immobilization)  [] Transferred from outside facility (Rehab or Long term care)  [] Age </= to 50

## 2024-08-03 NOTE — PROGRESS NOTE ADULT - ASSESSMENT
Zuri Castillo is a 74-year-old woman with a past medical history of prediabetes who presented with 2 days of facial droop. Endocrinology consulted for newly diagnosed DMII.  Patient is high risk with high level decision making due to uncontrolled diabetes with A1C>10 which places patient at high risk for cardiovascular and cerebrovascular events. Patient with lability of glucose requiring close monitoring and insulin adjustments.    #T2DM, uncontrolled   A1c: 10.7, newly dx on admission. Goal A1C for this patient 7.5%  Home meds: none  Recommendations:  - Fasting glucose above goal, increase Lantus to 15 units SC QHS   - postprandial glucose above goal, increase Admelog to 7 units SC Premeal/TIDAC - hold if not eating or if npo   - continue with Admelog Low Correction Scale Premeal & separate low Correction Scale Bedtime   - Goal -180  - FS Blood glucose monitoring Premeal/Bedtime   - Hypoglycemia protocol   - Carb Consistent Diet   - Nutrition consult   - Provider to RN for DM education   DC Planning: TBD, but likely metformin and a DPP4 and prandin , may consider basal insulin as well closer to discharge    #HTN  - on Losartan 25 mg inpatient  Recommendations:   - outpt BP goal < 130/80   - defer to primary team   - outpatient annual urinary microalb/cr ratio    #HLD  - On atorvastatin 80 mg inpatient  Recommendations:   - LDL goal < 55 due to high ASCVD risk   - defer to primary team   - outpatient fasting lipid profile     Will continue to follow     Nati Mclaughlin MD  Attending Physician   Department of Endocrinology, Diabetes and Metabolism   [facility]     For urgent matters before 9AM or after 5PM, or on weekends/holidays, please page the on call endocrine fellow.   For nonurgent matters, please email [email or web address] for assistance.

## 2024-08-04 VITALS
HEART RATE: 83 BPM | OXYGEN SATURATION: 97 % | TEMPERATURE: 99 F | SYSTOLIC BLOOD PRESSURE: 152 MMHG | RESPIRATION RATE: 18 BRPM | DIASTOLIC BLOOD PRESSURE: 92 MMHG

## 2024-08-04 LAB
GLUCOSE BLDC GLUCOMTR-MCNC: 111 MG/DL — HIGH (ref 70–99)
GLUCOSE BLDC GLUCOMTR-MCNC: 152 MG/DL — HIGH (ref 70–99)
GLUCOSE BLDC GLUCOMTR-MCNC: 155 MG/DL — HIGH (ref 70–99)

## 2024-08-04 PROCEDURE — 93306 TTE W/DOPPLER COMPLETE: CPT | Mod: 26

## 2024-08-04 PROCEDURE — 93356 MYOCRD STRAIN IMG SPCKL TRCK: CPT

## 2024-08-04 PROCEDURE — 99233 SBSQ HOSP IP/OBS HIGH 50: CPT

## 2024-08-04 RX ORDER — BENZOCAINE .06; .7 ML/1; ML/1
0 SWAB TOPICAL
Qty: 100 | Refills: 1
Start: 2024-08-04

## 2024-08-04 RX ORDER — LOSARTAN POTASSIUM 50 MG/1
50 TABLET, FILM COATED ORAL DAILY
Refills: 0 | Status: DISCONTINUED | OUTPATIENT
Start: 2024-08-04 | End: 2024-08-04

## 2024-08-04 RX ORDER — ASPIRIN 500 MG
1 TABLET ORAL
Qty: 30 | Refills: 0
Start: 2024-08-04 | End: 2024-09-02

## 2024-08-04 RX ORDER — ATORVASTATIN CALCIUM 40 MG/1
1 TABLET, FILM COATED ORAL
Qty: 0 | Refills: 0 | DISCHARGE
Start: 2024-08-04

## 2024-08-04 RX ORDER — LOSARTAN POTASSIUM 50 MG/1
25 TABLET, FILM COATED ORAL ONCE
Refills: 0 | Status: COMPLETED | OUTPATIENT
Start: 2024-08-04 | End: 2024-08-04

## 2024-08-04 RX ORDER — ATORVASTATIN CALCIUM 40 MG/1
1 TABLET, FILM COATED ORAL
Qty: 30 | Refills: 0
Start: 2024-08-04 | End: 2024-09-02

## 2024-08-04 RX ORDER — ASPIRIN 500 MG
1 TABLET ORAL
Qty: 0 | Refills: 0 | DISCHARGE
Start: 2024-08-04

## 2024-08-04 RX ORDER — SITAGLIPTIN 50 MG/1
1 TABLET, FILM COATED ORAL
Qty: 30 | Refills: 2
Start: 2024-08-04 | End: 2024-11-01

## 2024-08-04 RX ORDER — REPAGLINIDE 2 MG/1
1 TABLET ORAL
Qty: 90 | Refills: 2
Start: 2024-08-04 | End: 2024-11-01

## 2024-08-04 RX ORDER — LOSARTAN POTASSIUM 50 MG/1
1 TABLET, FILM COATED ORAL
Qty: 30 | Refills: 0
Start: 2024-08-04 | End: 2024-09-02

## 2024-08-04 RX ORDER — CLOPIDOGREL BISULFATE 75 MG/1
1 TABLET, FILM COATED ORAL
Qty: 30 | Refills: 0
Start: 2024-08-04 | End: 2024-09-02

## 2024-08-04 RX ORDER — LOSARTAN POTASSIUM 50 MG/1
1 TABLET, FILM COATED ORAL
Qty: 0 | Refills: 0 | DISCHARGE
Start: 2024-08-04

## 2024-08-04 RX ORDER — CLOPIDOGREL BISULFATE 75 MG/1
1 TABLET, FILM COATED ORAL
Qty: 0 | Refills: 0 | DISCHARGE
Start: 2024-08-04

## 2024-08-04 RX ADMIN — CLOPIDOGREL BISULFATE 75 MILLIGRAM(S): 75 TABLET, FILM COATED ORAL at 11:31

## 2024-08-04 RX ADMIN — LOSARTAN POTASSIUM 25 MILLIGRAM(S): 50 TABLET, FILM COATED ORAL at 10:33

## 2024-08-04 RX ADMIN — Medication 7 UNIT(S): at 07:50

## 2024-08-04 RX ADMIN — Medication 1: at 16:16

## 2024-08-04 RX ADMIN — Medication 81 MILLIGRAM(S): at 11:31

## 2024-08-04 RX ADMIN — Medication 7 UNIT(S): at 11:30

## 2024-08-04 RX ADMIN — LOSARTAN POTASSIUM 25 MILLIGRAM(S): 50 TABLET, FILM COATED ORAL at 05:09

## 2024-08-04 RX ADMIN — Medication 1: at 07:49

## 2024-08-04 RX ADMIN — Medication 7 UNIT(S): at 16:17

## 2024-08-04 NOTE — PROGRESS NOTE ADULT - SUBJECTIVE AND OBJECTIVE BOX
INTERVAL HPI/OVERNIGHT EVENTS:  Seen at the bedside. Tolerating diet well, denies nausea or vomiting.  Glucose at goal       Review of systems:   CONSTITUTIONAL:  Feels well, good appetite  CARDIOVASCULAR:  Negative for chest pain or palpitations  RESPIRATORY:  Negative for cough, or SOB   GASTROINTESTINAL:  Negative for nausea, vomiting, or abdominal pain  GENITOURINARY:  Negative frequency, urgency or dysuria     CAPILLARY BLOOD GLUCOSE       POCT Blood Glucose.: 111 mg/dL (04 Aug 2024 11:20)  POCT Blood Glucose.: 152 mg/dL (04 Aug 2024 07:21)  POCT Blood Glucose.: 121 mg/dL (03 Aug 2024 20:47)  POCT Blood Glucose.: 124 mg/dL (03 Aug 2024 16:20)    POCT Blood Glucose.: 212 mg/dL (03 Aug 2024 11:29)  POCT Blood Glucose.: 193 mg/dL (03 Aug 2024 07:45)  POCT Blood Glucose.: 281 mg/dL (02 Aug 2024 22:07)  POCT Blood Glucose.: 291 mg/dL (02 Aug 2024 17:02)       MEDICATIONS  (STANDING):  aspirin  chewable 81 milliGRAM(s) Oral daily  atorvastatin 80 milliGRAM(s) Oral at bedtime  clopidogrel Tablet 75 milliGRAM(s) Oral daily  dextrose 5%. 1000 milliLiter(s) (100 mL/Hr) IV Continuous <Continuous>  dextrose 5%. 1000 milliLiter(s) (50 mL/Hr) IV Continuous <Continuous>  dextrose 50% Injectable 12.5 Gram(s) IV Push once  dextrose 50% Injectable 25 Gram(s) IV Push once  dextrose 50% Injectable 25 Gram(s) IV Push once  enoxaparin Injectable 40 milliGRAM(s) SubCutaneous every 24 hours  glucagon  Injectable 1 milliGRAM(s) IntraMuscular once  insulin glargine Injectable (LANTUS) 15 Unit(s) SubCutaneous at bedtime  insulin lispro (ADMELOG) corrective regimen sliding scale   SubCutaneous three times a day before meals  insulin lispro (ADMELOG) corrective regimen sliding scale   SubCutaneous at bedtime  insulin lispro Injectable (ADMELOG) 7 Unit(s) SubCutaneous three times a day before meals  losartan 25 milliGRAM(s) Oral daily    MEDICATIONS  (PRN):  dextrose Oral Gel 15 Gram(s) Oral once PRN Blood Glucose LESS THAN 70 milliGRAM(s)/deciliter      PHYSICAL EXAM   Vital Signs Last 24 Hrs  T(C): 37.1 (04 Aug 2024 08:17), Max: 37.1 (04 Aug 2024 08:17)  T(F): 98.8 (04 Aug 2024 08:17), Max: 98.8 (04 Aug 2024 08:17)  HR: 86 (04 Aug 2024 08:17) (80 - 95)  BP: 167/104 (04 Aug 2024 08:17) (167/104 - 187/116)  BP(mean): --  RR: 18 (04 Aug 2024 08:17) (18 - 18)  SpO2: 98% (04 Aug 2024 08:17) (98% - 99%)    Parameters below as of 04 Aug 2024 08:17  Patient On (Oxygen Delivery Method): room air            GENERAL: feMale laying in bed in NAD  RESPIRATORY: nonlabored breathing, no accessory muscle use  Extremities: Warm, no edema in all 4 exts   NEURO: A&O X3    LABS:                      08-02    135  |  101  |  21  ----------------------------<  317<H>  3.8   |  23  |  1.11    Ca    9.1      02 Aug 2024 22:15            Urinalysis Basic - ( 02 Aug 2024 22:15 )    Color: x / Appearance: x / SG: x / pH: x  Gluc: 317 mg/dL / Ketone: x  / Bili: x / Urobili: x   Blood: x / Protein: x / Nitrite: x   Leuk Esterase: x / RBC: x / WBC x   Sq Epi: x / Non Sq Epi: x / Bacteria: x      Thyroid Stimulating Hormone, Serum: 1.06 uIU/mL (08-01 @ 14:53)

## 2024-08-04 NOTE — DISCHARGE NOTE NURSING/CASE MANAGEMENT/SOCIAL WORK - PATIENT PORTAL LINK FT
You can access the FollowMyHealth Patient Portal offered by [facility] by registering at the following website: [email or web address]. By joining [facility]’s FollowMyHealth portal, you will also be able to view your health information using other applications (apps) compatible with our system.

## 2024-08-04 NOTE — DISCHARGE NOTE PROVIDER - PROVIDER TOKENS
PROVIDER:[TOKEN:[[id number]],FOLLOWUP:[1 week]],PROVIDER:[TOKEN:[[id number]],FOLLOWUP:[1 week]] PROVIDER:[TOKEN:[[id number]],FOLLOWUP:[1 week]],PROVIDER:[TOKEN:[[id number]],FOLLOWUP:[1 week]],PROVIDER:[TOKEN:[[id number]]]

## 2024-08-04 NOTE — DISCHARGE NOTE NURSING/CASE MANAGEMENT/SOCIAL WORK - NSDCPEFALRISK_GEN_ALL_CORE
For information on Fall & Injury Prevention, visit: [email or web address] OR  [email or web address] OR  https://www.cdc.gov/steadi/patient.html

## 2024-08-04 NOTE — DISCHARGE NOTE PROVIDER - NSDCMRMEDTOKEN_GEN_ALL_CORE_FT
aspirin 81 mg oral tablet, chewable: 1 tab(s) orally once a day  atorvastatin 80 mg oral tablet: 1 tab(s) orally once a day (at bedtime)  clopidogrel 75 mg oral tablet: 1 tab(s) orally once a day  Januvia 100 mg oral tablet: 1 tab(s) orally once a day  losartan 50 mg oral tablet: 1 tab(s) orally once a day  MetFORMIN (Eqv-Fortamet) 500 mg oral tablet, extended release: 1 tab(s) orally 2 times a day  repaglinide 0.5 mg oral tablet: 1 tab(s) orally 3 times a day 15-30 min before each meal   alcohol swabs: Apply topically to affected area 4 times a day  aspirin 81 mg oral tablet, chewable: 1 tab(s) orally once a day  atorvastatin 80 mg oral tablet: 1 tab(s) orally once a day (at bedtime)  clopidogrel 75 mg oral tablet: 1 tab(s) orally once a day  glucometer (per patient&#x27;s insurance): Test blood sugars four times a day. Dispense #1 glucometer.  glucose tablets: Follow instructions on bottle when sugar is low.  Januvia 100 mg oral tablet: 1 tab(s) orally once a day  lancets: 1 application subcutaneously 4 times a day  losartan 50 mg oral tablet: 1 tab(s) orally once a day  MetFORMIN (Eqv-Fortamet) 500 mg oral tablet, extended release: 1 tab(s) orally 2 times a day  repaglinide 0.5 mg oral tablet: 1 tab(s) orally 3 times a day 15-30 min before each meal  test strips (per patient&#x27;s insurance): 1 application subcutaneously 4 times a day. ** Compatible with patient&#x27;s glucometer **   aspirin 81 mg oral tablet, chewable: 1 tab(s) orally once a day  aspirin 81 mg oral tablet, chewable: 1 tab(s) orally once a day  atorvastatin 80 mg oral tablet: 1 tab(s) orally once a day (at bedtime)  atorvastatin 80 mg oral tablet: 1 tab(s) orally once a day (at bedtime)  clopidogrel 75 mg oral tablet: 1 tab(s) orally once a day  clopidogrel 75 mg oral tablet: 1 tab(s) orally once a day  losartan 50 mg oral tablet: 1 tab(s) orally once a day  losartan 50 mg oral tablet: 1 tab(s) orally once a day  MetFORMIN (Eqv-Fortamet) 500 mg oral tablet, extended release: 1 tab(s) orally 2 times a day  repaglinide 0.5 mg oral tablet: 1 tab(s) orally 3 times a day 15-30 min before each meal

## 2024-08-04 NOTE — PROGRESS NOTE ADULT - SUBJECTIVE AND OBJECTIVE BOX
THE PATIENT WAS SEEN AND EXAMINED BY ME WITH THE HOUSESTAFF AND STROKE TEAM DURING MORNING ROUNDS.   HPI:  74-year old R handed woman, PMH of pre-diabetes, not on any medications, now presenting for R facial droop of 2 days presentation.    Neurology team consulted for stroke evaluation.  Noticed speech was altered, progressively worsened.     Patient also reports slurred speech, feeling unsteady while walking, slowed gait. Slurred speech had progressively worsened, persistent, therefore presenting to the ED for further evaluation.    Denies swallowing issues, vision issues, hearing issues/ear pain/ringing in ears.  States overall weakness from day prior.  No recent illnesses, no ear pain.   No change in taste.    NIHSS 2  MRS 0  LKW 2 days prior to presentation, 7/29/24  /117  glucose 112  AC/AP: aspirin 81 daily (not prescribed) for few years duration    No prior similar episodes.  No FH strokes.  No other meds (02 Aug 2024 01:04)      SUBJECTIVE: No events overnight.  No new neurologic complaints.  ROS reported negative unless otherwise noted.    aspirin  chewable 81 milliGRAM(s) Oral daily  atorvastatin 80 milliGRAM(s) Oral at bedtime  clopidogrel Tablet 75 milliGRAM(s) Oral daily  dextrose 5%. 1000 milliLiter(s) IV Continuous <Continuous>  dextrose 5%. 1000 milliLiter(s) IV Continuous <Continuous>  dextrose 50% Injectable 12.5 Gram(s) IV Push once  dextrose 50% Injectable 25 Gram(s) IV Push once  dextrose 50% Injectable 25 Gram(s) IV Push once  dextrose Oral Gel 15 Gram(s) Oral once PRN  enoxaparin Injectable 40 milliGRAM(s) SubCutaneous every 24 hours  glucagon  Injectable 1 milliGRAM(s) IntraMuscular once  insulin glargine Injectable (LANTUS) 15 Unit(s) SubCutaneous at bedtime  insulin lispro (ADMELOG) corrective regimen sliding scale   SubCutaneous three times a day before meals  insulin lispro (ADMELOG) corrective regimen sliding scale   SubCutaneous at bedtime  insulin lispro Injectable (ADMELOG) 7 Unit(s) SubCutaneous three times a day before meals  losartan 25 milliGRAM(s) Oral daily      PHYSICAL EXAM:   Vital Signs Last 24 Hrs  T(C): 37.1 (04 Aug 2024 08:17), Max: 37.1 (03 Aug 2024 09:56)  T(F): 98.8 (04 Aug 2024 08:17), Max: 98.8 (04 Aug 2024 08:17)  HR: 86 (04 Aug 2024 08:17) (80 - 98)  BP: 167/104 (04 Aug 2024 08:17) (167/104 - 187/116)  BP(mean): --  RR: 18 (04 Aug 2024 08:17) (18 - 18)  SpO2: 98% (04 Aug 2024 08:17) (97% - 99%)    Parameters below as of 04 Aug 2024 08:17  Patient On (Oxygen Delivery Method): room air        General: No acute distress  HEENT: EOM intact, visual fields full  Abdomen: Soft, nontender, nondistended   Extremities: No edema    NEUROLOGICAL EXAM:  Mental status: Awake, Alert, Oriented to person, place, and time. Speech dysarthric, repetition and naming intact. Follows simple commands.  Cranial Nerves: Right facial droop no nystagmus, tongue midline  Motor exam: Normal tone, no drift, 5/5 RUE, 4+/5 RLE, 5/5 LUE, 5/5 LLE, No dysmetria.   Sensation: Intact to light touch   Gait: Not assessed at this time     LABS:   08-02    135  |  101  |  21  ----------------------------<  317<H>  3.8   |  23  |  1.11    Ca    9.1      02 Aug 2024 22:15          IMAGING:  MRI brain w/o 8/2/2024:   Abnormal T2 prolongation restricted diffusion is seen involving the posterior limb of the left internal capsule. This is compatible with an acute infarct. No hemorrhagic transformation is seen. No significant shift or herniation is seen.    8/1/24 CTH:   Focal hypodensity in the left basal ganglia and left cerebellum, in the absence of prior imaging for comparison, age indeterminant. Correlation with MRI to determine acuity is recommended.  No intracranial hematoma or mass effect.    CTA HEAD:  No large vessel occlusion. Moderate luminal narrowing at the proximal left M1-MCA. Suggestion of tiny aneurysmal outpouching at the left supraclinoid ICA. No AVM.    CTA NECK:  No high-grade stenosis or occlusion in extracranial carotid and vertebral arteries.  Coiling/looping of proximal right ICA, anatomical variation.   THE PATIENT WAS SEEN AND EXAMINED BY ME WITH THE HOUSESTAFF AND STROKE TEAM DURING MORNING ROUNDS.     HPI:  74-year old R handed woman, PMH of pre-diabetes, not on any medications, now presenting for R facial droop of 2 days presentation. Noticed speech was altered, progressively worsened.   Patient also reports slurred speech, feeling unsteady while walking, slowed gait. Slurred speech had progressively worsened, persistent, therefore presenting to the ED for further evaluation.    Denies swallowing issues, vision issues, hearing issues/ear pain/ringing in ears.  States overall weakness from day prior.    NIHSS 2  MRS 0  LKW 2 days prior to presentation, 7/29/24  /117  glucose 112  AC/AP: aspirin 81 daily (not prescribed) for few years duration    SUBJECTIVE: No events overnight.  No new neurologic complaints.      aspirin  chewable 81 milliGRAM(s) Oral daily  atorvastatin 80 milliGRAM(s) Oral at bedtime  clopidogrel Tablet 75 milliGRAM(s) Oral daily  dextrose 5%. 1000 milliLiter(s) IV Continuous <Continuous>  dextrose 5%. 1000 milliLiter(s) IV Continuous <Continuous>  dextrose 50% Injectable 12.5 Gram(s) IV Push once  dextrose 50% Injectable 25 Gram(s) IV Push once  dextrose 50% Injectable 25 Gram(s) IV Push once  dextrose Oral Gel 15 Gram(s) Oral once PRN  enoxaparin Injectable 40 milliGRAM(s) SubCutaneous every 24 hours  glucagon  Injectable 1 milliGRAM(s) IntraMuscular once  insulin glargine Injectable (LANTUS) 15 Unit(s) SubCutaneous at bedtime  insulin lispro (ADMELOG) corrective regimen sliding scale   SubCutaneous three times a day before meals  insulin lispro (ADMELOG) corrective regimen sliding scale   SubCutaneous at bedtime  insulin lispro Injectable (ADMELOG) 7 Unit(s) SubCutaneous three times a day before meals  losartan 25 milliGRAM(s) Oral daily    PHYSICAL EXAM:   Vital Signs Last 24 Hrs  T(C): 37.1 (04 Aug 2024 08:17), Max: 37.1 (03 Aug 2024 09:56)  T(F): 98.8 (04 Aug 2024 08:17), Max: 98.8 (04 Aug 2024 08:17)  HR: 86 (04 Aug 2024 08:17) (80 - 98)  BP: 167/104 (04 Aug 2024 08:17) (167/104 - 187/116)  RR: 18 (04 Aug 2024 08:17) (18 - 18)  SpO2: 98% (04 Aug 2024 08:17) (97% - 99%)    Parameters below as of 04 Aug 2024 08:17  Patient On (Oxygen Delivery Method): room air    General: No acute distress  HEENT: EOM intact, visual fields full  Abdomen: Soft, nontender, nondistended   Extremities: No edema    NEUROLOGICAL EXAM:  Mental status: Awake, Alert, Oriented to person, place, and time. Speech dysarthric, repetition and naming intact. Follows simple commands.  Cranial Nerves: Right facial droop no nystagmus, tongue midline  Motor exam: Normal tone, no drift, 5/5 RUE, 4+/5 RLE, 5/5 LUE, 5/5 LLE, No dysmetria.   Sensation: Intact to light touch   Gait: Not assessed at this time     LABS:   08-02    135  |  101  |  21  ----------------------------<  317<H>  3.8   |  23  |  1.11    Ca    9.1      02 Aug 2024 22:15      IMAGING:  MRI brain w/o 8/2/2024:   Abnormal T2 prolongation restricted diffusion is seen involving the posterior limb of the left internal capsule. This is compatible with an acute infarct. No hemorrhagic transformation is seen. No significant shift or herniation is seen.    8/1/24 CTH:   Focal hypodensity in the left basal ganglia and left cerebellum, in the absence of prior imaging for comparison, age indeterminant. Correlation with MRI to determine acuity is recommended.  No intracranial hematoma or mass effect.    CTA HEAD:  No large vessel occlusion. Moderate luminal narrowing at the proximal left M1-MCA. Suggestion of tiny aneurysmal outpouching at the left supraclinoid ICA. No AVM.    CTA NECK:  No high-grade stenosis or occlusion in extracranial carotid and vertebral arteries.  Coiling/looping of proximal right ICA, anatomical variation.   THE PATIENT WAS SEEN AND EXAMINED BY ME WITH THE HOUSESTAFF AND STROKE TEAM DURING MORNING ROUNDS.     HPI:  74-year old R handed woman, PMH of pre-diabetes, not on any medications, now presenting for R facial droop of 2 days presentation. Noticed speech was altered, progressively worsened.   Patient also reports slurred speech, feeling unsteady while walking, slowed gait. Slurred speech had progressively worsened, persistent, therefore presenting to the ED for further evaluation.    Denies swallowing issues, vision issues, hearing issues/ear pain/ringing in ears.  States overall weakness from day prior.    NIHSS 2  MRS 0  LKW 2 days prior to presentation, 7/29/24  /117  glucose 112  AC/AP: aspirin 81 daily (not prescribed) for few years duration    SUBJECTIVE: No events overnight.  No new neurologic complaints.      aspirin  chewable 81 milliGRAM(s) Oral daily  atorvastatin 80 milliGRAM(s) Oral at bedtime  clopidogrel Tablet 75 milliGRAM(s) Oral daily  dextrose 5%. 1000 milliLiter(s) IV Continuous <Continuous>  dextrose 5%. 1000 milliLiter(s) IV Continuous <Continuous>  dextrose 50% Injectable 12.5 Gram(s) IV Push once  dextrose 50% Injectable 25 Gram(s) IV Push once  dextrose 50% Injectable 25 Gram(s) IV Push once  dextrose Oral Gel 15 Gram(s) Oral once PRN  enoxaparin Injectable 40 milliGRAM(s) SubCutaneous every 24 hours  glucagon  Injectable 1 milliGRAM(s) IntraMuscular once  insulin glargine Injectable (LANTUS) 15 Unit(s) SubCutaneous at bedtime  insulin lispro (ADMELOG) corrective regimen sliding scale   SubCutaneous three times a day before meals  insulin lispro (ADMELOG) corrective regimen sliding scale   SubCutaneous at bedtime  insulin lispro Injectable (ADMELOG) 7 Unit(s) SubCutaneous three times a day before meals  losartan 25 milliGRAM(s) Oral daily    PHYSICAL EXAM:   Vital Signs Last 24 Hrs  T(C): 37.1 (04 Aug 2024 08:17), Max: 37.1 (03 Aug 2024 09:56)  T(F): 98.8 (04 Aug 2024 08:17), Max: 98.8 (04 Aug 2024 08:17)  HR: 86 (04 Aug 2024 08:17) (80 - 98)  BP: 167/104 (04 Aug 2024 08:17) (167/104 - 187/116)  RR: 18 (04 Aug 2024 08:17) (18 - 18)  SpO2: 98% (04 Aug 2024 08:17) (97% - 99%)    Parameters below as of 04 Aug 2024 08:17  Patient On (Oxygen Delivery Method): room air    General: No acute distress  HEENT: EOM intact, visual fields full  Abdomen: Soft, nontender, nondistended   Extremities: No edema    NEUROLOGICAL EXAM:  Mental status: Awake, Alert, Oriented to person, place, and time. Speech dysarthric, repetition and naming intact. Follows simple commands.  Cranial Nerves: Right facial droop no nystagmus, tongue midline  Motor exam: Normal tone, no drift, 5/5 RUE, 4+/5 RLE, 5/5 LUE, 5/5 LLE, subtle RUE dysmetria.   Sensation: Intact to light touch   Gait: Not assessed at this time     LABS:   08-02    135  |  101  |  21  ----------------------------<  317<H>  3.8   |  23  |  1.11    Ca    9.1      02 Aug 2024 22:15      IMAGING:  MRI brain w/o 8/2/2024:   Abnormal T2 prolongation restricted diffusion is seen involving the posterior limb of the left internal capsule. This is compatible with an acute infarct. No hemorrhagic transformation is seen. No significant shift or herniation is seen.    8/1/24 CTH:   Focal hypodensity in the left basal ganglia and left cerebellum, in the absence of prior imaging for comparison, age indeterminant. Correlation with MRI to determine acuity is recommended.  No intracranial hematoma or mass effect.    CTA HEAD:  No large vessel occlusion. Moderate luminal narrowing at the proximal left M1-MCA. Suggestion of tiny aneurysmal outpouching at the left supraclinoid ICA. No AVM.    CTA NECK:  No high-grade stenosis or occlusion in extracranial carotid and vertebral arteries.  Coiling/looping of proximal right ICA, anatomical variation.   THE PATIENT WAS SEEN AND EXAMINED BY ME WITH THE HOUSESTAFF AND STROKE TEAM DURING MORNING ROUNDS.     HPI:  74 year old R handed woman, PMH of pre-diabetes, not on any medications, now presenting for R facial droop of 2 days presentation. Noticed speech was altered, progressively worsened.   Patient also reports slurred speech, feeling unsteady while walking, slowed gait. Slurred speech had progressively worsened, persistent, therefore presenting to the ED for further evaluation.    Denies swallowing issues, vision issues, hearing issues/ear pain/ringing in ears.  States overall weakness from day prior.    NIHSS 2  MRS 0  LKW 2 days prior to presentation, 7/29/24  /117  glucose 112  AC/AP: aspirin 81 daily (not prescribed) for few years duration    SUBJECTIVE: No events overnight.  No new neurologic complaints.      aspirin  chewable 81 milliGRAM(s) Oral daily  atorvastatin 80 milliGRAM(s) Oral at bedtime  clopidogrel Tablet 75 milliGRAM(s) Oral daily  dextrose 5%. 1000 milliLiter(s) IV Continuous <Continuous>  dextrose 5%. 1000 milliLiter(s) IV Continuous <Continuous>  dextrose 50% Injectable 12.5 Gram(s) IV Push once  dextrose 50% Injectable 25 Gram(s) IV Push once  dextrose 50% Injectable 25 Gram(s) IV Push once  dextrose Oral Gel 15 Gram(s) Oral once PRN  enoxaparin Injectable 40 milliGRAM(s) SubCutaneous every 24 hours  glucagon  Injectable 1 milliGRAM(s) IntraMuscular once  insulin glargine Injectable (LANTUS) 15 Unit(s) SubCutaneous at bedtime  insulin lispro (ADMELOG) corrective regimen sliding scale   SubCutaneous three times a day before meals  insulin lispro (ADMELOG) corrective regimen sliding scale   SubCutaneous at bedtime  insulin lispro Injectable (ADMELOG) 7 Unit(s) SubCutaneous three times a day before meals  losartan 25 milliGRAM(s) Oral daily    PHYSICAL EXAM:   Vital Signs Last 24 Hrs  T(C): 37.1 (04 Aug 2024 08:17), Max: 37.1 (03 Aug 2024 09:56)  T(F): 98.8 (04 Aug 2024 08:17), Max: 98.8 (04 Aug 2024 08:17)  HR: 86 (04 Aug 2024 08:17) (80 - 98)  BP: 167/104 (04 Aug 2024 08:17) (167/104 - 187/116)  RR: 18 (04 Aug 2024 08:17) (18 - 18)  SpO2: 98% (04 Aug 2024 08:17) (97% - 99%)    Parameters below as of 04 Aug 2024 08:17  Patient On (Oxygen Delivery Method): room air    General: No acute distress  HEENT: EOM intact, visual fields full  Abdomen: Soft, nontender, nondistended   Extremities: No edema    NEUROLOGICAL EXAM:  Mental status: Awake, Alert, Oriented to person, place, and time. Speech dysarthric, repetition and naming intact. Follows simple commands.  Cranial Nerves: Right facial droop no nystagmus, tongue midline  Motor exam: Normal tone, no drift, 5/5 RUE, 4+/5 RLE, 5/5 LUE, 5/5 LLE, subtle RUE dysmetria.   Sensation: Intact to light touch   Gait: Not assessed at this time     LABS:   08-02    135  |  101  |  21  ----------------------------<  317<H>  3.8   |  23  |  1.11    Ca    9.1      02 Aug 2024 22:15      IMAGING:  MRI brain w/o 8/2/24:   Abnormal T2 prolongation restricted diffusion is seen involving the posterior limb of the left internal capsule. This is compatible with an acute infarct. No hemorrhagic transformation is seen. No significant shift or herniation is seen.    8/1/24 CTH:   Focal hypodensity in the left basal ganglia and left cerebellum, in the absence of prior imaging for comparison, age indeterminant. Correlation with MRI to determine acuity is recommended.  No intracranial hematoma or mass effect.    CTA HEAD:  No large vessel occlusion. Moderate luminal narrowing at the proximal left M1-MCA. Suggestion of tiny aneurysmal outpouching at the left supraclinoid ICA. No AVM.    CTA NECK:  No high-grade stenosis or occlusion in extracranial carotid and vertebral arteries.  Coiling/looping of proximal right ICA, anatomical variation.

## 2024-08-04 NOTE — DISCHARGE NOTE PROVIDER - HOSPITAL COURSE
74-year old R handed woman, PMH of pre-diabetes, not on any medications, now presenting for R facial droop of 2 days presentation. Noticed speech was altered, progressively worsened.   Patient also reports slurred speech, feeling unsteady while walking, slowed gait. Slurred speech had progressively worsened, persistent, therefore presenting to the ED for further evaluation.    Denies swallowing issues, vision issues, hearing issues/ear pain/ringing in ears.  States overall weakness from day prior.    NIHSS 2  MRS 0  LKW 2 days prior to presentation, 7/29/24  /117  glucose 112  AC/AP: aspirin 81 daily (not prescribed) for few years duration    MRI brain w/o 8/2/2024:   Abnormal T2 prolongation restricted diffusion is seen involving the posterior limb of the left internal capsule. This is compatible with an acute infarct. No hemorrhagic transformation is seen. No significant shift or herniation is seen.    8/1/24 CTH:   Focal hypodensity in the left basal ganglia and left cerebellum, in the absence of prior imaging for comparison, age indeterminant. Correlation with MRI to determine acuity is recommended.  No intracranial hematoma or mass effect.    CTA HEAD:  No large vessel occlusion. Moderate luminal narrowing at the proximal left M1-MCA. Suggestion of tiny aneurysmal outpouching at the left supraclinoid ICA. No AVM.    CTA NECK:  No high-grade stenosis or occlusion in extracranial carotid and vertebral arteries.  Coiling/looping of proximal right ICA, anatomical variation.    IMPRESSION: Right facial droop, RUE weakness, dysarthria 2/2 left brain dysfunction due to acute ischemic stroke in left internal capsule. Mechanism likely small vessel disease. 74-year old R handed woman, PMH of pre-diabetes, not on any medications, now presenting for R facial droop of 2 days presentation. Noticed speech was altered, progressively worsened.   Patient also reports slurred speech, feeling unsteady while walking, slowed gait. Slurred speech had progressively worsened, persistent, therefore presenting to the ED for further evaluation.    Denies swallowing issues, vision issues, hearing issues/ear pain/ringing in ears.  States overall weakness from day prior.    NIHSS 2  MRS 0  LKW 2 days prior to presentation, 7/29/24  /117  glucose 112  AC/AP: aspirin 81 daily (not prescribed) for few years duration    MRI brain w/o 8/2/2024:   Abnormal T2 prolongation restricted diffusion is seen involving the posterior limb of the left internal capsule. This is compatible with an acute infarct. No hemorrhagic transformation is seen. No significant shift or herniation is seen.    8/1/24 CTH:   Focal hypodensity in the left basal ganglia and left cerebellum, in the absence of prior imaging for comparison, age indeterminant. Correlation with MRI to determine acuity is recommended.  No intracranial hematoma or mass effect.    CTA HEAD:  No large vessel occlusion. Moderate luminal narrowing at the proximal left M1-MCA. Suggestion of tiny aneurysmal outpouching at the left supraclinoid ICA. No AVM.    CTA NECK:  No high-grade stenosis or occlusion in extracranial carotid and vertebral arteries.  Coiling/looping of proximal right ICA, anatomical variation.    IMPRESSION: Right facial droop, RUE weakness, dysarthria 2/2 left brain dysfunction due to acute ischemic stroke in left internal capsule. Mechanism likely small vessel disease.     Continue Losartan 50mg Daily. Follow up with Cardiology outpatient    Endocrinology: she can be discharged on metformin  mg BID with breakfast and dinner, januvia 100 mg daily and Repaglinide0.5 mg TID 15-30 minutes before each meal (do not take if skips a meal)    8/4: Case discussed with Dr. Miller. Patient stable for discharge to home. 74-year old R handed woman, PMH of pre-diabetes, not on any medications, now presenting for R facial droop of 2 days presentation. Noticed speech was altered, progressively worsened.   Patient also reports slurred speech, feeling unsteady while walking, slowed gait. Slurred speech had progressively worsened, persistent, therefore presenting to the ED for further evaluation.    Denies swallowing issues, vision issues, hearing issues/ear pain/ringing in ears.  States overall weakness from day prior.    NIHSS 2  MRS 0  LKW 2 days prior to presentation, 7/29/24  /117  glucose 112  AC/AP: aspirin 81 daily (not prescribed) for few years duration    MRI brain w/o 8/2/2024:   Abnormal T2 prolongation restricted diffusion is seen involving the posterior limb of the left internal capsule. This is compatible with an acute infarct. No hemorrhagic transformation is seen. No significant shift or herniation is seen.    8/1/24 CTH:   Focal hypodensity in the left basal ganglia and left cerebellum, in the absence of prior imaging for comparison, age indeterminant. Correlation with MRI to determine acuity is recommended.  No intracranial hematoma or mass effect.    CTA HEAD:  No large vessel occlusion. Moderate luminal narrowing at the proximal left M1-MCA. Suggestion of tiny aneurysmal outpouching at the left supraclinoid ICA. No AVM.    CTA NECK:  No high-grade stenosis or occlusion in extracranial carotid and vertebral arteries.  Coiling/looping of proximal right ICA, anatomical variation.    IMPRESSION: Right facial droop, RUE weakness, dysarthria 2/2 left brain dysfunction due to acute ischemic stroke in left internal capsule. Mechanism likely small vessel disease.     Continue Losartan 50mg Daily. Follow up with Cardiology outpatient    Endocrinology: she can be discharged on metformin  mg BID with breakfast and dinner, Repaglinide0.5 mg TID 15-30 minutes before each meal (do not take if skips a meal)    Outpatient Speech, outpatient physical therapy, outpatient occupational therapy script given.     TTE 8/3/24:       8/4: Case discussed with Dr. Miller. Patient stable for discharge to home. 74-year old R handed woman, PMH of pre-diabetes, not on any medications, now presenting for R facial droop of 2 days presentation. Noticed speech was altered, progressively worsened.   Patient also reports slurred speech, feeling unsteady while walking, slowed gait. Slurred speech had progressively worsened, persistent, therefore presenting to the ED for further evaluation.    Denies swallowing issues, vision issues, hearing issues/ear pain/ringing in ears.  States overall weakness from day prior.    NIHSS 2  MRS 0  LKW 2 days prior to presentation, 7/29/24  /117  glucose 112  AC/AP: aspirin 81 daily (not prescribed) for few years duration    MRI brain w/o 8/2/2024:   Abnormal T2 prolongation restricted diffusion is seen involving the posterior limb of the left internal capsule. This is compatible with an acute infarct. No hemorrhagic transformation is seen. No significant shift or herniation is seen.    8/1/24 CTH:   Focal hypodensity in the left basal ganglia and left cerebellum, in the absence of prior imaging for comparison, age indeterminant. Correlation with MRI to determine acuity is recommended.  No intracranial hematoma or mass effect.    CTA HEAD:  No large vessel occlusion. Moderate luminal narrowing at the proximal left M1-MCA. Suggestion of tiny aneurysmal outpouching at the left supraclinoid ICA. No AVM.    CTA NECK:  No high-grade stenosis or occlusion in extracranial carotid and vertebral arteries.  Coiling/looping of proximal right ICA, anatomical variation.    IMPRESSION: Right facial droop, RUE weakness, dysarthria 2/2 left brain dysfunction due to acute ischemic stroke in left internal capsule. Mechanism likely small vessel disease.     Continue Losartan 50mg Daily. Follow up with Cardiology outpatient    Endocrinology: she can be discharged on metformin  mg BID with breakfast and dinner, Repaglinide0.5 mg TID 15-30 minutes before each meal (do not take if skips a meal)    Outpatient Speech, outpatient physical therapy, outpatient occupational therapy script given.     TTE 8/3/24:     1. Left ventricular cavity is normal in size. Left ventricular wall thickness is normal. Left ventricular systolic function is normal with an ejection fraction visually estimated at 50 to 55 %. There are no regional wall motion abnormalities seen.   2. There is mild (grade 1) left ventricular diastolic dysfunction, with normal left ventricular filling pressure.   3. Normal right ventricular cavity size, with normal wall thickness, and normal right ventricular systolic function. Tricuspid annular plane systolic excursion (TAPSE) is 1.6 cm (normal >=1.7 cm). Tricuspid annular tissue Doppler S' is 13.4 cm/s (normal >10 cm/s).   4. No pericardial effusion seen.   5. No prior echocardiogram is available for comparison.   6. There is increased LV mass and concentric hypertrophy.    8/4: Case discussed with Dr. Miller. Patient stable for discharge to home.

## 2024-08-04 NOTE — PROGRESS NOTE ADULT - ASSESSMENT
74-year old R handed woman, PMH of diabetes, not on any medications, originally presented with right facial droop, worsening slurred speech. Found to have posterior limb of the left internal capsule acute ischemic infarct. Neuro exam pertinent for right facial droop, weakness in RUE, dysarthric speech.     IMPRESSION: Right facial droop, RUE weakness, dysarthria 2/2 left brain dysfunction due to acute ischemic stroke in left internal capsule. Mechanism likely small vessel disease.     NEURO: Neurologically improved since admission. Continue close monitoring for neurologic deterioration. Titrate statin to LDL goal less than 70. MRI Brain w/o, MRA Head w/o and Neck w/contrast as above. Physical therapy/OT: Home PT/OT     ANTITHROMBOTIC THERAPY: ASA and Plavix      PULMONARY: CXR clear, protecting airway, saturating well     CARDIOVASCULAR: TTE pending. Continue cardiac monitoring. Started on Losartan 25mg Daily                        SBP goal: 140-160s    GASTROINTESTINAL: Passed dysphagia screen       Diet: Regular     RENAL: BUN/Cr stable, good urine output      Na Goal: Greater than 135     Dickson: NO     HEMATOLOGY: H/H stable, Platelets normal      DVT ppx: Lovenox     ID: afebrile, no leukocytosis     OTHER: Patient and family updated at bedside, all questions answered. Endocrinology following, A1C: 10.7    DISPOSITION: Outpatient Home PT/OT once stable and workup is complete    CORE MEASURES:        Admission NIHSS: 2     TPA: NO     LDL/HDL: 149/43     Depression Screen: Pending      Statin Therapy: YES      Dysphagia Screen: PASS      Smoking: NO     AFIB: NO      Stroke Education: YES     Obtain screening lower extremity venous ultrasound in patients who meet 1 or more of the following criteria as patient is high risk for DVT/PE on admission:   [] History of DVT/PE  [] Hypercoagulable states (Factor V Leiden, Cancer, OCP, etc. )  [] Prolonged immobility (hemiplegia/hemiparesis/post operative or any other extended immobilization)  [] Transferred from outside facility (Rehab or Long term care)  [] Age </= to 50 74-year old R handed woman, PMH of diabetes, not on any medications, originally presented with right facial droop, worsening slurred speech. Found to have posterior limb of the left internal capsule acute ischemic infarct. Neuro exam pertinent for right facial droop, weakness in RUE, dysarthric speech.     IMPRESSION: Right facial droop, RUE weakness, dysarthria 2/2 left brain dysfunction due to acute ischemic stroke in left internal capsule. Mechanism likely small vessel disease.     NEURO: Neurologically improved since admission. Continue close monitoring for neurologic deterioration. Titrate statin to LDL goal less than 70. MRI Brain w/o, MRA Head w/o and Neck w/contrast as above. Physical therapy/OT: Home PT/OT     ANTITHROMBOTIC THERAPY: ASA and Plavix      PULMONARY: CXR clear, protecting airway, saturating well     CARDIOVASCULAR: TTE pending. Continue cardiac monitoring. Inc losartan to 50mg daily                     SBP goal: 120-160s    GASTROINTESTINAL: Passed dysphagia screen       Diet: Regular     RENAL: BUN/Cr stable, good urine output      Na Goal: Greater than 135     Dickson: NO     HEMATOLOGY: H/H stable, Platelets normal      DVT ppx: Lovenox     ID: afebrile, no leukocytosis     OTHER: Patient and family updated at bedside, all questions answered. Endocrinology following, A1C: 10.7    DISPOSITION: Outpatient Home PT/OT once stable and workup is complete    CORE MEASURES:        Admission NIHSS: 2     TPA: NO     LDL/HDL: 149/43     Depression Screen: Pending      Statin Therapy: YES      Dysphagia Screen: PASS      Smoking: NO     AFIB: NO      Stroke Education: YES     Obtain screening lower extremity venous ultrasound in patients who meet 1 or more of the following criteria as patient is high risk for DVT/PE on admission:   [] History of DVT/PE  [] Hypercoagulable states (Factor V Leiden, Cancer, OCP, etc. )  [] Prolonged immobility (hemiplegia/hemiparesis/post operative or any other extended immobilization)  [] Transferred from outside facility (Rehab or Long term care)  [] Age </= to 50 74-year old R handed woman, PMH of diabetes, not on any medications, originally presented with right facial droop, worsening slurred speech. Found to have posterior limb of the left internal capsule acute ischemic infarct. Neuro exam pertinent for right facial droop, weakness in RUE, dysarthric speech.     IMPRESSION: Right facial droop, RUE weakness, dysarthria 2/2 left brain dysfunction due to acute ischemic stroke in left internal capsule. Mechanism likely small vessel disease.     NEURO: Neurologically improved since admission. Continue close monitoring for neurologic deterioration. Titrate statin to LDL goal less than 70. MRI Brain w/o, MRA Head w/o and Neck w/contrast as above. Physical therapy/OT: Home PT/OT     ANTITHROMBOTIC THERAPY: ASA and Plavix      PULMONARY: CXR clear, protecting airway, saturating well     CARDIOVASCULAR: Continue cardiac monitoring. BP: 170s, Losartan increased to 50mg Daily. TTE pending                     SBP goal: 120-160s    GASTROINTESTINAL: Passed dysphagia screen       Diet: Regular     RENAL: BUN/Cr stable, good urine output      Na Goal: Greater than 135     Dickson: NO     HEMATOLOGY: H/H stable, Platelets normal      DVT ppx: Lovenox     ID: afebrile, no leukocytosis     OTHER: Patient and family updated at bedside, all questions answered. Endocrinology following, A1C: 10.7. ENDO discharge recommendations: metformin  mg BID with breakfast and dinner, januvia 100 mg daily and Repaglinide0.5 mg TID 15-30 minutes before each meal (do not take if skips a meal)    DISPOSITION: Outpatient Home PT/OT once stable and workup is complete. Discharge to home on 8/4.     CORE MEASURES:        Admission NIHSS: 2     TPA: NO     LDL/HDL: 149/43     Depression Screen: Pending      Statin Therapy: YES      Dysphagia Screen: PASS      Smoking: NO     AFIB: NO      Stroke Education: YES     Obtain screening lower extremity venous ultrasound in patients who meet 1 or more of the following criteria as patient is high risk for DVT/PE on admission:   [] History of DVT/PE  [] Hypercoagulable states (Factor V Leiden, Cancer, OCP, etc. )  [] Prolonged immobility (hemiplegia/hemiparesis/post operative or any other extended immobilization)  [] Transferred from outside facility (Rehab or Long term care)  [] Age </= to 50

## 2024-08-04 NOTE — DISCHARGE NOTE PROVIDER - CARE PROVIDERS DIRECT ADDRESSES
,DirectAddress_Unknown,[email or web address] ,DirectAddress_Unknown,[email or web address],DirectAddress_Unknown

## 2024-08-04 NOTE — DISCHARGE NOTE PROVIDER - NSDCCPCAREPLAN_GEN_ALL_CORE_FT
PRINCIPAL DISCHARGE DIAGNOSIS  Diagnosis: CVA (cerebrovascular accident)  Assessment and Plan of Treatment: You were admitted to stroke service and were found to have a stroke on MRI brain. You were started on aspirin and plavix. Endocrine saw you to optimize your glucose levels.   . Please follow up with neurologist in 1 week. The office will call you to schedule an appointment, if you do not hear from them please call [phone]. Continue taking medications as prescribed. If you were prescribed a statin for your cholesterol please make sure you have your liver enzymes checked with your primary care physician. Monitor your blood pressure. Reduce fat, cholesterol and salt in your diet. Increase intake of fruits and vegetables. Limit alcohol to minimum and do not smoke. You may be at risk for falling, make changes to your home to help you walk easier. Keep up to date on vaccinations.  If you experience any symptoms of facial drooping, slurred speech, arm or leg weakness, severe headache, vision changes or any worsening symptoms, notify provider immediately and return to ER.

## 2024-08-04 NOTE — PROGRESS NOTE ADULT - NS ATTEND AMEND GEN_ALL_CORE FT
I agree with resident/PA/NP 's history, exam, orders placed, and plan of care
Patient seen and examined on AM stroke rounds with SEGUN Calero.     Patient had MRi brain yesterday that showed a L genu of IC stroke.    On exam:   GEN :NAD  NEURO:   Awake, alert, oriented to month, date, year, normal naming, repetition, fluency.   PUpils 3-2mm, symmetric, full VF's, normal EOM, no nystagmus, no R sided facial weakness, dysarthria.   MOTOR: normal bulk and tone.  R biceps is 4/5, all else 5/5 upper and lower extremities, no drift.   SENSORY: intact symmerrically to light touch  COORDINATION: normal FNF    MRI brain images reviewed: L genu of internal capsule stroke  CTA H&N images reviewed: no significant cervical carotid or vertebral stenosis.  No large vessel occlusion.     10.4    AP: 74 year old woman with htn, DM, presenting with R facial weakness, and dysarthria  On exam, hypertensive, facial asymmetry, and dysarthria, and R biceps weakness NIHSS of 2.  MRI showing the L IC stroke.    Most consistent with small vessel stroke.    Continue DAPT, per [facility] protocol.   High intensity statin.   Endo recs regarding uncontrolled DM appreciated.    Gradually bring to normotension.   Echo.

## 2024-08-04 NOTE — PROGRESS NOTE ADULT - ASSESSMENT
Zuri Castillo is a 74-year-old woman with a past medical history of prediabetes who presented with 2 days of facial droop. Endocrinology consulted for newly diagnosed DMII.  Patient is high risk with high level decision making due to uncontrolled diabetes with A1C>10 which places patient at high risk for cardiovascular and cerebrovascular events. Patient with lability of glucose requiring close monitoring and insulin adjustments.    #T2DM, uncontrolled   A1c: 10.7, newly dx on admission. Goal A1C for this patient 7.5%  Home meds: none  Recommendations:  - Fasting glucose at goal, continue with Lantus  15 units SC QHS   - postprandial glucose at goal, continue with Admelog  7 units SC Premeal/TIDAC - hold if not eating or if npo   - continue with Admelog Low Correction Scale Premeal & separate low Correction Scale Bedtime   - Goal -180  - FS Blood glucose monitoring Premeal/Bedtime   - Hypoglycemia protocol   - Carb Consistent Diet   - Nutrition consult   - Provider to RN for DM education   DC Planning: TBD, but likely metformin and a DPP4 and prandin , may consider basal insulin as well closer to discharge    #HTN  - on Losartan 25 mg inpatient  Recommendations:   - outpt BP goal < 130/80   - defer to primary team   - outpatient annual urinary microalb/cr ratio    #HLD  - On atorvastatin 80 mg inpatient  Recommendations:   - LDL goal < 55 due to high ASCVD risk   - defer to primary team   - outpatient fasting lipid profile     Will continue to follow     Nati Mclaughlin MD  Attending Physician   Department of Endocrinology, Diabetes and Metabolism   [facility] Teams     For urgent matters before 9AM or after 5PM, or on weekends/holidays, please page the on call endocrine fellow.   For nonurgent matters, please email [email or web address] for assistance.

## 2024-08-05 PROBLEM — Z00.00 ENCOUNTER FOR PREVENTIVE HEALTH EXAMINATION: Status: ACTIVE | Noted: 2024-08-05

## 2024-08-12 ENCOUNTER — NON-APPOINTMENT (OUTPATIENT)
Age: 74
End: 2024-08-12

## 2024-08-13 ENCOUNTER — APPOINTMENT (OUTPATIENT)
Dept: NEUROLOGY | Facility: HOSPITAL | Age: 74
End: 2024-08-13
Payer: SUBSIDIZED

## 2024-08-13 ENCOUNTER — NON-APPOINTMENT (OUTPATIENT)
Age: 74
End: 2024-08-13

## 2024-08-13 ENCOUNTER — APPOINTMENT (OUTPATIENT)
Dept: CARDIOLOGY | Facility: HOSPITAL | Age: 74
End: 2024-08-13
Payer: SUBSIDIZED

## 2024-08-13 VITALS
DIASTOLIC BLOOD PRESSURE: 81 MMHG | WEIGHT: 156 LBS | HEIGHT: 63 IN | OXYGEN SATURATION: 100 % | HEART RATE: 98 BPM | BODY MASS INDEX: 27.64 KG/M2 | SYSTOLIC BLOOD PRESSURE: 158 MMHG

## 2024-08-13 VITALS
TEMPERATURE: 98 F | WEIGHT: 156 LBS | DIASTOLIC BLOOD PRESSURE: 116 MMHG | HEART RATE: 101 BPM | SYSTOLIC BLOOD PRESSURE: 180 MMHG | HEIGHT: 63 IN | RESPIRATION RATE: 18 BRPM | OXYGEN SATURATION: 100 % | BODY MASS INDEX: 27.64 KG/M2

## 2024-08-13 DIAGNOSIS — I63.81 OTHER CEREBRAL INFARCTION DUE TO OCCLUSION OR STENOSIS OF SMALL ARTERY: ICD-10-CM

## 2024-08-13 DIAGNOSIS — I10 ESSENTIAL (PRIMARY) HYPERTENSION: ICD-10-CM

## 2024-08-13 DIAGNOSIS — E11.9 TYPE 2 DIABETES MELLITUS W/OUT COMPLICATIONS: ICD-10-CM

## 2024-08-13 DIAGNOSIS — E78.5 HYPERLIPIDEMIA, UNSPECIFIED: ICD-10-CM

## 2024-08-13 LAB
ANION GAP SERPL CALC-SCNC: 14 MMOL/L
BUN SERPL-MCNC: 15 MG/DL
CALCIUM SERPL-MCNC: 9.6 MG/DL
CHLORIDE SERPL-SCNC: 98 MMOL/L
CO2 SERPL-SCNC: 25 MMOL/L
CREAT SERPL-MCNC: 1.12 MG/DL
EGFR: 51 ML/MIN/1.73M2
GLUCOSE SERPL-MCNC: 247 MG/DL
POTASSIUM SERPL-SCNC: 4.2 MMOL/L
SODIUM SERPL-SCNC: 137 MMOL/L

## 2024-08-13 PROCEDURE — 99214 OFFICE O/P EST MOD 30 MIN: CPT

## 2024-08-13 RX ORDER — LOSARTAN POTASSIUM 50 MG/1
50 TABLET, FILM COATED ORAL DAILY
Refills: 0 | Status: ACTIVE | COMMUNITY
Start: 2024-08-13

## 2024-08-13 RX ORDER — AMLODIPINE BESYLATE 5 MG/1
5 TABLET ORAL DAILY
Qty: 90 | Refills: 0 | Status: ACTIVE | COMMUNITY
Start: 2024-08-13 | End: 1900-01-01

## 2024-08-13 RX ORDER — CLOPIDOGREL BISULFATE 75 MG/1
75 TABLET, FILM COATED ORAL DAILY
Refills: 0 | Status: ACTIVE | COMMUNITY
Start: 2024-08-13

## 2024-08-13 RX ORDER — METFORMIN HYDROCHLORIDE 500 MG/1
500 TABLET, FILM COATED, EXTENDED RELEASE ORAL TWICE DAILY
Refills: 0 | Status: ACTIVE | COMMUNITY
Start: 2024-08-13

## 2024-08-13 RX ORDER — ATORVASTATIN CALCIUM 80 MG/1
80 TABLET, FILM COATED ORAL
Refills: 0 | Status: ACTIVE | COMMUNITY
Start: 2024-08-13

## 2024-08-13 RX ORDER — REPAGLINIDE 0.5 MG/1
0.5 TABLET ORAL 3 TIMES DAILY
Refills: 0 | Status: ACTIVE | COMMUNITY
Start: 2024-08-13

## 2024-08-13 RX ORDER — ASPIRIN 81 MG/1
81 TABLET, COATED ORAL DAILY
Refills: 0 | Status: ACTIVE | COMMUNITY
Start: 2024-08-13

## 2024-08-13 NOTE — REVIEW OF SYSTEMS
Additional Notes: Patient consent was obtained to proceed with the visit and recommended plan of care after discussion of all risks and benefits, including the risks of COVID-19 exposure. [As Noted in HPI] : as noted in HPI

## 2024-08-14 NOTE — HISTORY OF PRESENT ILLNESS
[FreeTextEntry1] : 74-year old R handed woman, PMH of diabetes, HLD, HTN, who originally presented for R facial droop of 2 days to [facility] ED. NIHSS 2. Was admitted to the Stroke service from 8/2-8/4/2024. A1c 10.7. . BP severely elevated on presentation at 210/117. MRI revealed a left posterior limb of the internal capsule acute infarct. Patient started on DAPT x 21 days (then aspirin 81mg daily lifelong afterward). Was seen by Endocrinology who recommended discharge prescriptions of metformin and repaglinide. Patient started on losartan 50mg daily for HTN. Was given scripts for PT/OT/speech therapy.  Patient is accompanied by daughter and is here for outpatient stroke f/u. Patient says her symptoms are stable - not better, but not worse. Says her speech is still slurred. She has weakness on the R side - she notices it most in her R hand (she is right-handed). Says she can hold things like a cup, but she cannot write properly. She is aware her blood pressure is elevated to 180/116 today, but she denies any symptoms related to this (no LH, dizziness, CP, etc). She has been taking her DAPT, statin, and losartan as prescribed. However, she did stop the metformin because it was making her nauseated and she was having emesis in the AMs. She reports she IS swallowing fine and tolerating a regular diet.  She has scripts for PT/OT/speech therapy - HOWEVER, she has NO insurance. Therefore, she has not found a provider at this time. She has someone coming to her house to check her BP. Her BP is elevated at home. SBP is always between 160-180. DBP is always over 100 (usually 105+).   Cardiology appointment is today. Patient is agreeable to discuss her BP medication with them today. Has an appointment with Endocrinology scheduled for November 2024. PCP appointment is in September 2024. The importance of close PCP f/u was expressed to both patient and her daughter. We discussed in detail the importance of medication adherance and risk factor modification for secondary prevention of stroke. We discussed the importance of PT/OT/speech therapy to recover/improve function.  Patient has been walking 5 minutes every day. She reportedly becomes SOB within 5 minutes. Recommended she try graded exercise therapy to build up to 20-30 minutes daily. Attending neurologist also showed patient exercises she can do at home to prevent contractures in her hand.  She denies previous history of stroke. Was not seeing a physician regularly (carried only a diagnosis of "pre-diabetes") and thought she was healthy before her stroke.  Lives with daughter in a house in [place]. Non-smoker, no alcohol, no marijuana/drugs. Walks without AD. Not working at her age and has never worked.  Patient says her mood is fine, but daughter says patient is "down." Sits in a dark room, does not call friends, not talkative. During exam, she makes very poor eye contact.

## 2024-08-14 NOTE — END OF VISIT
[] : Resident [FreeTextEntry3] : pt seen and examined. has mild R hemiparesis as above.  would benefit from PT/OT if possible but limited access given lack of insurance. provided education on options. 2ndary stroke prevention as above. f/u cardiology, pmd. all questions answered, education provided, management discussed at length, [Time Spent: ___ minutes] : I have spent [unfilled] minutes of time on the encounter.

## 2024-08-14 NOTE — PHYSICAL EXAM
Check with surgery center about aspirin - if you need to stop before surgery  Schedule physical with Dr Raad Hou [FreeTextEntry1] : General - Sitting up on exam table, in NAD, appears stated age, poor eye contact Cardiovascular - No LE edema Eyes - Non-injected conjunctivae, anicteric sclerae  Neurologic Exam: Mental status: - Awake, Alert - Oriented to: person. Says she is in "the hospital." Knows month/year, but not exact date. - Speech: fluent - Repetition and naming: intact  - Follows simple and cross commands  Cranial nerves - PERRL, VFF on confrontational testing but very poor fixation, EOMI, face sensation (V1-V3) intact b/l, facial strength - R facial weakness in the lower face, hearing grossly intact, palate with symmetric elevation, shoulder shrug intact b/l, tongue deviates to the right on protrusion but likely due to positioning of her mouth i/s/o R facial weakness Dysarthria: mild-to-moderate  Motor - Normal bulk throughout. Strength testing - very poor participation in MMT - almost not testable on my exam despite encouragement and repeated instruction Likely a slight drift in the RUE Deltoid is 4+/5 on the right She does have difficultly with FFM on the right side No orbiting on forearm rolling R HF 4+/5 Otherwise LE are ~5/5  Sensation - Light touch intact throughout  DTRs Poor participation but grossly symmetrical b/l, likely diminished throughout, no ankle clonus  Coordination - FNF intact b/l. There is some difficulty with HTS on the right-side but may be consistent with her R-sided weakness. No tremors appreciated.  Gait and station - Walks unassisted

## 2024-08-14 NOTE — PHYSICAL EXAM
[FreeTextEntry1] : General - Sitting up on exam table, in NAD, appears stated age, poor eye contact Cardiovascular - No LE edema Eyes - Non-injected conjunctivae, anicteric sclerae  Neurologic Exam: Mental status: - Awake, Alert - Oriented to: person. Says she is in "the hospital." Knows month/year, but not exact date. - Speech: fluent - Repetition and naming: intact  - Follows simple and cross commands  Cranial nerves - PERRL, VFF on confrontational testing but very poor fixation, EOMI, face sensation (V1-V3) intact b/l, facial strength - R facial weakness in the lower face, hearing grossly intact, palate with symmetric elevation, shoulder shrug intact b/l, tongue deviates to the right on protrusion but likely due to positioning of her mouth i/s/o R facial weakness Dysarthria: mild-to-moderate  Motor - Normal bulk throughout. Strength testing - very poor participation in MMT - almost not testable on my exam despite encouragement and repeated instruction Likely a slight drift in the RUE Deltoid is 4+/5 on the right She does have difficultly with FFM on the right side No orbiting on forearm rolling R HF 4+/5 Otherwise LE are ~5/5  Sensation - Light touch intact throughout  DTRs Poor participation but grossly symmetrical b/l, likely diminished throughout, no ankle clonus  Coordination - FNF intact b/l. There is some difficulty with HTS on the right-side but may be consistent with her R-sided weakness. No tremors appreciated.  Gait and station - Walks unassisted

## 2024-08-14 NOTE — DATA REVIEWED
[de-identified] : MR BRAIN    PROCEDURE DATE:  08/02/2024      INTERPRETATION:  Clinical indication: CVA. Slurred speech  MRI of the brain was performed using sagittal T1 axial T1 and T2 T2 FLAIR  diffusion and susceptibility sequence. Coronal T2-weighted sequence was  performed as well.  Extensive parenchymal volume loss and chronic microvessel ischemic  changes are identified  Abnormal T2 prolongation restricted diffusion is seen involving the  posterior limb of the left internal capsule. This is compatible with an  acute infarct. No hemorrhagic transformation is seen. No significant  shift or herniation is seen.  The large vessels demonstrate normal flow voids  The visualized paranasal sinuses mastoid and middle ear regions are clear.  Degenerative change about the visualized cervical spine is seen.  IMPRESSION: Acute infarct as described above [de-identified] : A1c (8/1/2024): 10.7% LDL (8/2/2024): 149

## 2024-08-14 NOTE — DISCUSSION/SUMMARY
[FreeTextEntry1] : 74-year old R handed woman, PMH of diabetes, HLD, HTN, who originally presented for R facial droop of 2 days to [facility] ED and was subsequently admitted to the Stroke service. MRI revealed a left posterior limb of the internal capsule acute infarct. Patient now presents for outpatient follow-up.  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 She is still very early out from the onset of her symptoms and reports her symptoms are stable. Her recovery has been complicated by inability to access therapy services d/t insurance issues. Otherwise, she has been compliant with her home medications including antiplatelet agents (other than metformin, which she cannot tolerate d/t GI SE). Her BP remains poorly controlled.  Recommendations: - Continue clopidogrel 75mg to complete 21 days as prescribed. Continue aspirin 81mg lifelong. - Continue atorvastatin 80mg daily. LDL goal is <70 (<55 may be preferred).  - BP is elevated today. Patient is asymptomatic. She has a Cardiology appointment today, so we recommend she discuss her BP with them this PM. They will be best equipped to manage her HTN. Patient/daughter is in agreement with plan. Is currently on losartan 50mg daily. SCr 1.11 at discharge from hospital. - Continue diabetes medications as prescribed (metformin, repaglinide). D/w PCP at f/u about metformin intolerance. Also will be seeing Endocrinology in November 2024. - PCP follow-up planned for 9/10/2024. The importance of this follow-up was stressed to patient/daughter. Needs AGGRESSIVE risk factor management with PCP. HTN, HLD, and T2DM must all be controlled. Goal BP is SBP <140, but <130/80 may be ideal target (goal needs to be tailored to patient's tolerability), LDL <70 (<55 may be ideal), A1c <7%. - Patient directed to [facility] for possibly more access to PT/OT/speech therapy services and specialists i/s/o no insurance. However, we cannot guarantee any access. - Patient given a list of PT/OT/speech therapy offices to call to see if they will accept her for sliding scale - Continue to monitor patient's mood, may benefit from Behavioral Health referral - RTC in 6 months  Seen and d/w Neurology attending.

## 2024-08-14 NOTE — HISTORY OF PRESENT ILLNESS
[FreeTextEntry1] : 74-year old R handed woman, PMH of diabetes, HLD, HTN, who originally presented for R facial droop of 2 days to [facility] ED. NIHSS 2. Was admitted to the Stroke service from 8/2-8/4/2024. A1c 10.7. . BP severely elevated on presentation at 210/117. MRI revealed a left posterior limb of the internal capsule acute infarct. Patient started on DAPT x 21 days (then aspirin 81mg daily lifelong afterward). Was seen by Endocrinology who recommended discharge prescriptions of metformin and repaglinide. Patient started on losartan 50mg daily for HTN. Was given scripts for PT/OT/speech therapy.  Patient is accompanied by daughter and is here for outpatient stroke f/u. Patient says her symptoms are stable - not better, but not worse. Says her speech is still slurred. She has weakness on the R side - she notices it most in her R hand (she is right-handed). Says she can hold things like a cup, but she cannot write properly. She is aware her blood pressure is elevated to 180/116 today, but she denies any symptoms related to this (no LH, dizziness, CP, etc). She has been taking her DAPT, statin, and losartan as prescribed. However, she did stop the metformin because it was making her nauseated and she was having emesis in the AMs. She reports she IS swallowing fine and tolerating a regular diet.  She has scripts for PT/OT/speech therapy - HOWEVER, she has NO insurance. Therefore, she has not found a provider at this time. She has someone coming to her house to check her BP. Her BP is elevated at home. SBP is always between 160-180. DBP is always over 100 (usually 105+).   Cardiology appointment is today. Patient is agreeable to discuss her BP medication with them today. Has an appointment with Endocrinology scheduled for November 2024. PCP appointment is in September 2024. The importance of close PCP f/u was expressed to both patient and her daughter. We discussed in detail the importance of medication adherance and risk factor modification for secondary prevention of stroke. We discussed the importance of PT/OT/speech therapy to recover/improve function.  Patient has been walking 5 minutes every day. She reportedly becomes SOB within 5 minutes. Recommended she try graded exercise therapy to build up to 20-30 minutes daily. Attending neurologist also showed patient exercises she can do at home to prevent contractures in her hand.  She denies previous history of stroke. Was not seeing a physician regularly (carried only a diagnosis of "pre-diabetes") and thought she was healthy before her stroke.  Lives with daughter in a house in [place]. Non-smoker, no alcohol, no marijuana/drugs. Walks without AD. Not working at her age and has never worked.  Patient says her mood is fine, but daughter says patient is "down." Sits in a dark room, does not call friends, not talkative. During exam, she makes very poor eye contact.

## 2024-08-14 NOTE — DISCUSSION/SUMMARY
[FreeTextEntry1] : 74-year old R handed woman, PMH of diabetes, HLD, HTN, who originally presented for R facial droop of 2 days to [facility] ED and was subsequently admitted to the Stroke service. MRI revealed a left posterior limb of the internal capsule acute infarct. Patient now presents for outpatient follow-up.  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 She is still very early out from the onset of her symptoms and reports her symptoms are stable. Her recovery has been complicated by inability to access therapy services d/t insurance issues. Otherwise, she has been compliant with her home medications including antiplatelet agents (other than metformin, which she cannot tolerate d/t GI SE). Her BP remains poorly controlled.  Recommendations: - Continue clopidogrel 75mg to complete 21 days as prescribed. Continue aspirin 81mg lifelong. - Continue atorvastatin 80mg daily. LDL goal is <70 (<55 may be preferred).  - BP is elevated today. Patient is asymptomatic. She has a Cardiology appointment today, so we recommend she discuss her BP with them this PM. They will be best equipped to manage her HTN. Patient/daughter is in agreement with plan. Is currently on losartan 50mg daily. SCr 1.11 at discharge from hospital. - Continue diabetes medications as prescribed (metformin, repaglinide). D/w PCP at f/u about metformin intolerance. Also will be seeing Endocrinology in November 2024. - PCP follow-up planned for 9/10/2024. The importance of this follow-up was stressed to patient/daughter. Needs AGGRESSIVE risk factor management with PCP. HTN, HLD, and T2DM must all be controlled. Goal BP is SBP <140, but <130/80 may be ideal target (goal needs to be tailored to patient's tolerability), LDL <70 (<55 may be ideal), A1c <7%. - Patient directed to [facility] for possibly more access to PT/OT/speech therapy services and specialists i/s/o no insurance. However, we cannot guarantee any access. - Patient given a list of PT/OT/speech therapy offices to call to see if they will accept her for sliding scale - Continue to monitor patient's mood, may benefit from Behavioral Health referral - RTC in 6 months  Seen and d/w Neurology attending.

## 2024-08-14 NOTE — DATA REVIEWED
[de-identified] : MR BRAIN    PROCEDURE DATE:  08/02/2024      INTERPRETATION:  Clinical indication: CVA. Slurred speech  MRI of the brain was performed using sagittal T1 axial T1 and T2 T2 FLAIR  diffusion and susceptibility sequence. Coronal T2-weighted sequence was  performed as well.  Extensive parenchymal volume loss and chronic microvessel ischemic  changes are identified  Abnormal T2 prolongation restricted diffusion is seen involving the  posterior limb of the left internal capsule. This is compatible with an  acute infarct. No hemorrhagic transformation is seen. No significant  shift or herniation is seen.  The large vessels demonstrate normal flow voids  The visualized paranasal sinuses mastoid and middle ear regions are clear.  Degenerative change about the visualized cervical spine is seen.  IMPRESSION: Acute infarct as described above [de-identified] : A1c (8/1/2024): 10.7% LDL (8/2/2024): 149

## 2024-08-17 PROBLEM — I10 HYPERTENSION, UNSPECIFIED TYPE: Status: ACTIVE | Noted: 2024-08-13

## 2024-08-17 PROBLEM — E78.5 HYPERLIPIDEMIA, UNSPECIFIED HYPERLIPIDEMIA TYPE: Status: ACTIVE | Noted: 2024-08-13

## 2024-08-17 NOTE — PHYSICAL EXAM
[Well Developed] : well developed [Well Nourished] : well nourished [Normal Venous Pressure] : normal venous pressure [Normal S1, S2] : normal S1, S2 [No Murmur] : no murmur [No Gallop] : no gallop [Clear Lung Fields] : clear lung fields [Good Air Entry] : good air entry [No Respiratory Distress] : no respiratory distress  [Soft] : abdomen soft [Non Tender] : non-tender [No Edema] : no edema [Moves all extremities] : moves all extremities [Alert and Oriented] : alert and oriented [de-identified] : R-facial droop. Mild speech impairment

## 2024-08-17 NOTE — HISTORY OF PRESENT ILLNESS
[FreeTextEntry1] : 73 y/o female with PMHx of diabetes, uncontrolled hypertension, hyperlipidemia, recent L-internal capsule infarct, who presents to establish care for management of hypertension.  Recent admission 8/2/24 - 8/4/24: presented with R-facial droop and speech difficulty. Found to have acute ischemic stroke of left internal capsule. Also found to have significant hypertension. BP >200. Started on losartan. Advised to follow-up with cardiology for further management  Has been adherent with medications (see below). Has a BP machine at home, and measures daily. Forgot to bring her log today. Denies any symptoms of hypotension.  Able to walk ~1-2 blocks and climb 6-7 steps without any difficulty. Denies chest pain, palpitations, shortness of breath, orthopnea/PND, nausea/vomiting, dysuria, hematuria, hematochezia/melena.  Social history: Lives with daughter in a house in [place].  No tobacco use, No EtOH use, no drug use Ambulates without assistance  Relevant meds: - aspirin 81 mg daily - plavix 75 mg daily - metformin  mg BID - repaglinide 0.5 mg TID with meals - losartan 50 mg daily - atorvastatin 80 mg daily  Relevant labs: Hgb 13.2 Cr 1.1  A1c 10.7 TSH 1.06

## 2024-08-17 NOTE — HISTORY OF PRESENT ILLNESS
[FreeTextEntry1] : 73 y/o female with PMHx of diabetes, uncontrolled hypertension, hyperlipidemia, recent L-internal capsule infarct, who presents to establish care for management of hypertension.  Recent admission 8/2/24 - 8/4/24: presented with R-facial droop and speech difficulty. Found to have acute ischemic stroke of left internal capsule. Also found to have significant hypertension. BP >200. Started on losartan. Advised to follow-up with cardiology for further management  Has been adherent with medications (see below). Has a BP machine at home, and measures daily. Forgot to bring her log today. Denies any symptoms of hypotension.  Able to walk ~1-2 blocks and climb 6-7 steps without any difficulty. Denies chest pain, palpitations, shortness of breath, orthopnea/PND, nausea/vomiting, dysuria, hematuria, hematochezia/melena.  Social history: Lives with daughter in a house in [place].  No tobacco use, No EtOH use, no drug use Ambulates without assistance  Relevant meds: - aspirin 81 mg daily - plavix 75 mg daily - metformin  mg BID - repaglinide 0.5 mg TID with meals - losartan 50 mg daily - atorvastatin 80 mg daily  Relevant labs: Hgb 13.2 Cr 1.1  A1c 10.7 TSH 1.06

## 2024-08-17 NOTE — PHYSICAL EXAM
[Well Developed] : well developed [Well Nourished] : well nourished [Normal Venous Pressure] : normal venous pressure [Normal S1, S2] : normal S1, S2 [No Murmur] : no murmur [No Gallop] : no gallop [Clear Lung Fields] : clear lung fields [Good Air Entry] : good air entry [No Respiratory Distress] : no respiratory distress  [Soft] : abdomen soft [Non Tender] : non-tender [No Edema] : no edema [Moves all extremities] : moves all extremities [Alert and Oriented] : alert and oriented [de-identified] : R-facial droop. Mild speech impairment

## 2024-08-17 NOTE — CARDIOLOGY SUMMARY
[de-identified] : 8/1/24: sinus rhythm with LBBB, LAD, LVH [de-identified] : 8/4/24: CONCLUSIONS:  1. Left ventricular cavity is normal in size. Left ventricular wall thickness is normal. Left ventricular systolic function is normal with an ejection fraction visually estimated at 50 to 55 %. There are no regional wall motion abnormalities seen.  2. There is mild (grade 1) left ventricular diastolic dysfunction, with normal left ventricular filling pressure.  3. Normal right ventricular cavity size, with normal wall thickness, and normal right ventricular systolic function. Tricuspid annular plane systolic excursion (TAPSE) is 1.6 cm (normal >=1.7 cm). Tricuspid annular tissue Doppler S' is 13.4 cm/s (normal >10 cm/s).  4. No pericardial effusion seen.  5. No prior echocardiogram is available for comparison.  6. There is increased LV mass and concentric hypertrophy.

## 2024-08-17 NOTE — REVIEW OF SYSTEMS
[Weakness] : weakness [Fever] : no fever [Headache] : no headache [Chills] : no chills [Blurry Vision] : no blurred vision [SOB] : no shortness of breath [Dyspnea on exertion] : not dyspnea during exertion [Chest Discomfort] : no chest discomfort [Lower Ext Edema] : no extremity edema [Leg Claudication] : no intermittent leg claudication [Palpitations] : no palpitations [Orthopnea] : no orthopnea [PND] : no PND [Syncope] : no syncope [Cough] : no cough [Abdominal Pain] : no abdominal pain [Nausea] : no nausea [Vomiting] : no vomiting [Dysuria] : no dysuria

## 2024-08-17 NOTE — CARDIOLOGY SUMMARY
[de-identified] : 8/1/24: sinus rhythm with LBBB, LAD, LVH [de-identified] : 8/4/24: CONCLUSIONS:  1. Left ventricular cavity is normal in size. Left ventricular wall thickness is normal. Left ventricular systolic function is normal with an ejection fraction visually estimated at 50 to 55 %. There are no regional wall motion abnormalities seen.  2. There is mild (grade 1) left ventricular diastolic dysfunction, with normal left ventricular filling pressure.  3. Normal right ventricular cavity size, with normal wall thickness, and normal right ventricular systolic function. Tricuspid annular plane systolic excursion (TAPSE) is 1.6 cm (normal >=1.7 cm). Tricuspid annular tissue Doppler S' is 13.4 cm/s (normal >10 cm/s).  4. No pericardial effusion seen.  5. No prior echocardiogram is available for comparison.  6. There is increased LV mass and concentric hypertrophy.

## 2024-08-17 NOTE — ASSESSMENT
[FreeTextEntry1] : 73 y/o female with PMHx of diabetes, uncontrolled hypertension, hyperlipidemia, recent L-internal capsule infarct, who presents to establish care.  #Hypertension BP today 158/81 - continue losartan 50 mg daily - START amlodipine 5 mg daily - repeat BMP - maintain BP log at home, and bring to next visit  #Hyperlipideima - continue atorvastatin 80 mg daily - repeat lipid panel in ~6-8 weeks  #Ischemic stroke (L-internal capsule) - anti-platelet agents per neurology recs  Follow-up in 2-3 weeks for BP management

## 2024-08-21 ENCOUNTER — EMERGENCY (EMERGENCY)
Facility: HOSPITAL | Age: 74
LOS: 1 days | Discharge: HOME | End: 2024-08-21
Attending: STUDENT IN AN ORGANIZED HEALTH CARE EDUCATION/TRAINING PROGRAM
Payer: SELF-PAY

## 2024-08-21 VITALS
WEIGHT: 156.09 LBS | HEART RATE: 103 BPM | DIASTOLIC BLOOD PRESSURE: 199 MMHG | RESPIRATION RATE: 20 BRPM | OXYGEN SATURATION: 98 % | HEIGHT: 65 IN | TEMPERATURE: 98 F | SYSTOLIC BLOOD PRESSURE: 172 MMHG

## 2024-08-21 VITALS
RESPIRATION RATE: 18 BRPM | DIASTOLIC BLOOD PRESSURE: 89 MMHG | HEART RATE: 90 BPM | SYSTOLIC BLOOD PRESSURE: 153 MMHG | OXYGEN SATURATION: 99 % | TEMPERATURE: 98 F

## 2024-08-21 LAB
ALBUMIN SERPL ELPH-MCNC: 3.3 G/DL — SIGNIFICANT CHANGE UP (ref 3.3–5)
ALP SERPL-CCNC: 114 U/L — SIGNIFICANT CHANGE UP (ref 40–120)
ALT FLD-CCNC: 28 U/L — SIGNIFICANT CHANGE UP (ref 10–45)
ANION GAP SERPL CALC-SCNC: 16 MMOL/L — SIGNIFICANT CHANGE UP (ref 5–17)
AST SERPL-CCNC: 34 U/L — SIGNIFICANT CHANGE UP (ref 10–40)
BILIRUB SERPL-MCNC: 0.5 MG/DL — SIGNIFICANT CHANGE UP (ref 0.2–1.2)
BUN SERPL-MCNC: 15 MG/DL — SIGNIFICANT CHANGE UP (ref 7–23)
CALCIUM SERPL-MCNC: 9.3 MG/DL — SIGNIFICANT CHANGE UP (ref 8.4–10.5)
CHLORIDE SERPL-SCNC: 100 MMOL/L — SIGNIFICANT CHANGE UP (ref 96–108)
CO2 SERPL-SCNC: 19 MMOL/L — LOW (ref 22–31)
CREAT SERPL-MCNC: 0.84 MG/DL — SIGNIFICANT CHANGE UP (ref 0.5–1.3)
EGFR: 72 ML/MIN/1.73M2 — SIGNIFICANT CHANGE UP
GLUCOSE SERPL-MCNC: 232 MG/DL — HIGH (ref 70–99)
POTASSIUM SERPL-MCNC: 4.3 MMOL/L — SIGNIFICANT CHANGE UP (ref 3.5–5.3)
POTASSIUM SERPL-SCNC: 4.3 MMOL/L — SIGNIFICANT CHANGE UP (ref 3.5–5.3)
PROT SERPL-MCNC: 8.9 G/DL — HIGH (ref 6–8.3)
SODIUM SERPL-SCNC: 135 MMOL/L — SIGNIFICANT CHANGE UP (ref 135–145)
TROPONIN T, HIGH SENSITIVITY RESULT: <6 NG/L — SIGNIFICANT CHANGE UP (ref 0–51)

## 2024-08-21 PROCEDURE — 80053 COMPREHEN METABOLIC PANEL: CPT

## 2024-08-21 PROCEDURE — 36415 COLL VENOUS BLD VENIPUNCTURE: CPT

## 2024-08-21 PROCEDURE — 99285 EMERGENCY DEPT VISIT HI MDM: CPT

## 2024-08-21 PROCEDURE — 84484 ASSAY OF TROPONIN QUANT: CPT

## 2024-08-21 PROCEDURE — 99284 EMERGENCY DEPT VISIT MOD MDM: CPT

## 2024-08-21 RX ORDER — AMLODIPINE BESYLATE 2.5 MG/1
1 TABLET ORAL
Qty: 14 | Refills: 0
Start: 2024-08-21 | End: 2024-09-03

## 2024-08-21 RX ORDER — AMLODIPINE BESYLATE 2.5 MG/1
5 TABLET ORAL ONCE
Refills: 0 | Status: COMPLETED | OUTPATIENT
Start: 2024-08-21 | End: 2024-08-21

## 2024-08-21 RX ADMIN — AMLODIPINE BESYLATE 5 MILLIGRAM(S): 2.5 TABLET ORAL at 12:47

## 2024-08-21 NOTE — ED PROVIDER NOTE - ATTENDING CONTRIBUTION TO CARE
74-year-old female history of prediabetes, hypertension or CVA with right-sided deficits here with elevated blood pressure patient is on losartan and amlodipine patient with no headache no vision changes.  She denies any chest pain or shortness of breath.  By her daughter. pt w/ no new neuro deficit. SBP at home 160s/90s. I have reviewed the triage vital signs. Const: no acute distress, Well-developed, L facial droop Eyes: no conjunctival injection and no scleral icterus ENMT: Moist mucus membranes, CVS: +S1/S2, radial  pulse 2+ bilaterally RESP: Unlabored respiratory effort, Clear to auscultation bilaterally GI: Nontender/Nondistended soft abdomen, no CVA tenderness MSK: Extremities w/o deformity or ttp, Psych: Awake, Alert, & Orientedx3;  Appropriate mood and affect, cooperative  Plan for cr to eval for end organ damange, ekg nonischemic likely chronic htn, will incr home amlodipine, and reassess

## 2024-08-21 NOTE — ED PROVIDER NOTE - PHYSICAL EXAMINATION
Physical Exam:  General: NAD, Conversive  Eyes: EOMI, Conjunctiva and sclera clear. Pupils equal and reactive  Neck: No JVD  Lungs: No respiratory distress, lungs cta  Heart: Normal peripheral perfusion  Abdomen: Soft, nontender, nondistended, no CVA tenderness  Extremities: 2+ peripheral pulses, no edema  Psych: AAO X3  Neurologic: + facial droop A&O x4 Physical Exam:  General: NAD, Conversive  Eyes: EOMI, Conjunctiva and sclera clear. Pupils equal and reactive  Neck: No JVD  Lungs: No respiratory distress, lungs cta  Heart: Normal peripheral perfusion  Abdomen: Soft, nontender, nondistended, no CVA tenderness  Extremities: 2+ peripheral pulses, no edema  Psych: AAO X3  Neurologic: + L facial droop, A & O x4

## 2024-08-21 NOTE — ED ADULT NURSE NOTE - CINV DISCH MEDS REVIEWED YN
History of Present Illness





- General


Chief Complaint: Cold Symptoms


Stated Complaint: ABD PAIN, VOMITING


Time Seen by Provider: 01/24/18 15:09


History Source: Patient


Exam Limitations: No Limitations





- History of Present Illness


Initial Comments: 





01/24/18 15:28


Patient is a 52-year-old male with history of hypertension, high cholesterol, 

non-insulin-dependent diabetes.  Patient is an employee at a hospital, 3 days 

ago started to have diarrhea, chills, vomiting.  Ambulatory, eating and 

drinking without difficulty.  Currently afebrile.  





Past Medical History: [Denies].  





Allergies: No known allergies





Medications: [ See medication list]





Family History: Non-contributory





Social History: Denies smoking, alcohol use, or IVDU





Vital signs on arrival are [notable for pulse of 122]





Review of Systems


GENERAL/CONSTITUTIONAL: [Tactile fever. No weakness. No weight change.]


HEAD, EYES, EARS, NOSE AND THROAT: [No change in vision. No ear pain or 

discharge. No sore throat. ]


CARDIOVASCULAR: [No chest pain or shortness of breath.]


RESPIRATORY: [No cough, wheezing, or hemoptysis.]


GASTROINTESTINAL: [Nausea and vomiting + Diarrhea.]


GENITOURINARY: [No dysuria, frequency, or change in urination.]


MUSCULOSKELETAL: [No joint or muscle swelling or pain. No neck or back pain.]


SKIN AND BREASTS: [No rash or easy bruising.]


NEUROLOGIC: [No headache, vertigo, loss of consciousness, or loss of sensation.]


PSYCHIATRIC: [No depression or anxiety.]


ENDOCRINE: [No increased thirst. No abnormal weight change.]


HEMATOLOGIC/LYMPHATIC: [No anemia, easy bleeding, or history of blood clots.]


ALLERGIC/IMMUNOLOGIC: [No hives or skin allergy. No latex allergy.]





Physical Exam: 


GENERAL: [The patient is awake, alert, and fully oriented, in no acute distress.

]


EYES: [Pupils equal, round and reactive to light, extraocular movements intact, 

sclera anicteric, conjunctiva clear.]


ENT: [Ears normal, nares patent, oropharynx clear without exudates.  Moist 

mucous membranes. No uvula deviation. Moist mucus membranes.]


NECK: [Normal range of motion, supple without lymphadenopathy, JVD, or masses.]


LUNGS: [Breath sounds equal, clear to auscultation bilaterally.  No wheezes, 

and no crackles.]


HEART: [Regular rate and rhythm, normal S1 and S2 without murmur, rub or gallop.

]


ABDOMEN: [Soft, nontender, normoactive bowel sounds.  No guarding, no rebound.  

No masses. No bruising or abrasions]


MUSCULOSKELETAL: [Normal range of motion, no edema.  No clubbing or cyanosis. 

No cords, erythema, or tenderness. No CVA Tenderness with fist.]


NEUROLOGICAL: [Cranial nerves II through XII grossly intact.  Normal speech, 

normal gait.]


SKIN: [Warm, Dry, normal turgor, no rashes or lesions noted.]











Past History





- Past Medical History


Allergies/Adverse Reactions: 


 Allergies











Allergy/AdvReac Type Severity Reaction Status Date / Time


 


No Known Allergies Allergy   Verified 01/24/18 12:46











Home Medications: 


Ambulatory Orders





Lisinopril [Zestril] 40 mg PO DAILY 05/27/12 


Metformin HCl 500 mg PO ASDIR 01/24/18 


Ondansetron [Zofran Odt -] 4 mg SL TID #10 od.tablet 01/24/18 


Simvastatin 40 mg PO ASDIR 01/24/18 








COPD: No


Diabetes: Yes


HTN: Yes


Hypercholesterolemia: Yes





- Suicide/Smoking/Psychosocial Hx


Smoking Status: Yes


Smoking History: Current every day smoker


Number of Cigarettes Smoked Daily: 2


Information on smoking cessation initiated: No


Hx Alcohol Use: Yes


Drug/Substance Use Hx: No


Substance Use Type: Alcohol





*Physical Exam





- Vital Signs


 Last Vital Signs











Temp Pulse Resp BP Pulse Ox


 


 99.4 F   122 H  18   147/86   98 


 


 01/24/18 12:46  01/24/18 12:46  01/24/18 12:46  01/24/18 12:46  01/24/18 12:46














Medical Decision Making





- Medical Decision Making





01/24/18 15:40


A/P : Patient here with low-grade fever, diarrhea, nausea, reporting "I feel dry

". Patient is requesting to drink water. Patient with influenza-type illness 

versus viral there is no rebound tenderness or point tenderness to abdomen 

patient reports soreness to abdomen because of diarrhea. Zofran 4 mg by mouth 

1 given.


01/24/18 17:05


Influenza is negative, patient states he feels better and able to tolerate 

fluids after Zofran, no abdominal pain, also feels better after IV fluids heart 

rate is in normal range, I will discharge patient home, Zofran as needed for 

nausea, follow up with PMD in 2 days if symptoms persist. If any dizziness, 

chest pain, shortness of breath, abdominal pain, any other concerns return to 

ER.  Brule diet.





*DC/Admit/Observation/Transfer


Diagnosis at time of Disposition: 


 Viral gastroenteritis








- Discharge Dispostion


Disposition: HOME


Condition at time of disposition: Good


Admit: No





- Prescriptions


Prescriptions: 


Ondansetron [Zofran Odt -] 4 mg SL TID #10 od.tablet





- Referrals


Referrals: 


Arabella Beverly [Primary Care Provider] - 





- Patient Instructions


Printed Discharge Instructions:  DI for Viral Gastroenteritis -- Adult


Additional Instructions: 


Please increase fluids, gatorade.


Brule diet, bread, rice, toast, bananas.


Follow up with pmd in two days if symptoms persist.  





- Post Discharge Activity


Forms/Work/School Notes:  Back to Work
Yes

## 2024-08-21 NOTE — ED ADULT NURSE NOTE - BEFAST ARM NUMBNESS
Next Visit Date:  Future Appointments   Date Time Provider Nena Zamora   7/16/2020  7:00 AM Kristi Dupree DO [facility]  [id number]   7/30/2020  7:40 AM Kristi Dupree  [address] [facility]   
No

## 2024-08-21 NOTE — ED PROVIDER NOTE - CLINICAL SUMMARY MEDICAL DECISION MAKING FREE TEXT BOX
74-year-old female recent CVA presenting with asymptomatic hypertension.  Vitals do demonstrate hypertension to 172/107, RR 20, . Will repeat labs. 74-year-old female recent CVA presenting with asymptomatic hypertension.  Vitals do demonstrate hypertension to 172/107, RR 20, . Will repeat labs.  No BERTRAND or transaminitis, slightly elevated glucose of 232 .  Patient remains asymptomatic on reevaluation at 330.  Blood pressure improved after 5 mg of amlodipine.  Will discharge increased dose of 5 mg amlodipine continue losartan and follow-up with primary doctor.  Gave return precautions and discharged home.

## 2024-08-21 NOTE — ED ADULT NURSE NOTE - OBJECTIVE STATEMENT
75 yo Female PMH HLD, HTN, s/p stroke on August 1st with right sided facial droop noted as deficit reports to ED with complaint of high BP (188/110) when taken at home this AM. 75 yo Female PMH HLD, HTN, s/p stroke on August 1st with right sided facial droop noted as deficit reports to ED with complaint of high BP (188/110) when taken at home this AM. Upon assessment, pt is A&Ox4, speaking in full coherent sentences, denies chest pain or SOB, abd soft and nontender upon palpation, pt moving upper and lower extremities without difficulty. Pt denies headaches, double vision, or any visual changes. Bed locked in lowest position, safety and comfort measures maintained.

## 2024-08-21 NOTE — ED ADULT TRIAGE NOTE - CHIEF COMPLAINT QUOTE
Home nurse check BP having high BP. Pt had recent stroke d/c 08/04.  Taking losartan 50 mg and amlodipine 5mg . Endorses R side deficit after the stroke

## 2024-08-21 NOTE — ED PROVIDER NOTE - OBJECTIVE STATEMENT
74-year-old female history of prediabetes, CVA with right-sided deficits, presenting for asymptomatic hypertension.  Family at bedside reports that since discharge her systolic blood pressure has been consistently over 150 despite taking losartan 50 mg and amlodipine 5 mg.  Denies any headache changes in vision chest pain or shortness of breath.  She tried to make an appointment at [facility] however was unable to therefore came to the emergency department.

## 2024-08-21 NOTE — ED PROVIDER NOTE - PATIENT PORTAL LINK FT
You can access the FollowMyHealth Patient Portal offered by [facility] by registering at the following website: [email or web address]. By joining [employer]’s FollowMyHealth portal, you will also be able to view your health information using other applications (apps) compatible with our system.

## 2024-08-21 NOTE — ED ADULT NURSE NOTE - CADM POA CENTRAL LINE
Discussion/Summary   Normal mammogram repeat in one year         Verified Results  MA FFDM SCREEN DEZ W MAC W CAD 10Jan2018 02:56PM GLEY CASTILLO   Ordering Provider: GELY CASTILLO.    Reason For Study: screening,screening.     Test Name Result Flag Reference   MA FFDM SCREEN DEZ W MAC W CAD (Report)     Accession #    [id number]    Addendum  AMENDMENT: 1/23/2018  Naman Hess M.D.  Previous films are now made available from [facility] dated 3/9/17 and 3/8/16. No   significant change noted.    Amended BI-RADS: 2 Benign  letter sent: Amend Normal Comparison     **** FINAL ****    Dictated By:   NAMAN ARAYA MD    Electronically Reviewed and Approved By:  NAMAN ARAYA MD    #[id number] - MA FFDM SCREEN DEZ W MAC W CAD    BILATERAL DIGITAL SCREENING MAMMOGRAM 3D/2D WITH CAD: 1/10/2018    CLINICAL HISTORY:Routine annual screening mammogram - tomosynthesis.    COMPARISON:   Prior films have been requested, but have not been received.    FINDINGS:  There are scattered fibroglandular elements in both breasts.    There are benign vascular calcifications in the right breast. There also are post operative   findings in the right breast.    No significant masses, calcifications, or other findings are seen in either breast.  Current study was also evaluated with a Computer Aided Detection (CAD) system. Digital Breast   Tomosynthesis (DBT) images were obtained and used to assist in the interpretation of this   examination.    IMPRESSION: BENIGN    There is no mammographic evidence of malignancy. A 1 year screening mammogram is recommended.    MAMMOGRAPHY BI-RADS: 2 BENIGN    Paul Dalal M.D.  ag/penrad:1/10/2018 15:26:41    Imaging Technologist: RT Jsoie(R)(M), [facility]  letter sent: Normal Single Exam   **** FINAL ****    Dictated By:   BALTAZAR HERNANDEZ, PAUL BALLESTEROS    Electronically Reviewed and Approved By:  PAUL WU MD  Report last revised on  1/23/2018 07:17 CST by MARSHAL, NAMAN MADRID        No

## 2024-08-22 PROCEDURE — 85025 COMPLETE CBC W/AUTO DIFF WBC: CPT

## 2024-08-22 PROCEDURE — 85018 HEMOGLOBIN: CPT

## 2024-08-22 PROCEDURE — 84443 ASSAY THYROID STIM HORMONE: CPT

## 2024-08-22 PROCEDURE — 82435 ASSAY OF BLOOD CHLORIDE: CPT

## 2024-08-22 PROCEDURE — 92523 SPEECH SOUND LANG COMPREHEN: CPT

## 2024-08-22 PROCEDURE — 70551 MRI BRAIN STEM W/O DYE: CPT | Mod: MC

## 2024-08-22 PROCEDURE — 82803 BLOOD GASES ANY COMBINATION: CPT

## 2024-08-22 PROCEDURE — 85610 PROTHROMBIN TIME: CPT

## 2024-08-22 PROCEDURE — 84484 ASSAY OF TROPONIN QUANT: CPT

## 2024-08-22 PROCEDURE — 96374 THER/PROPH/DIAG INJ IV PUSH: CPT

## 2024-08-22 PROCEDURE — 82947 ASSAY GLUCOSE BLOOD QUANT: CPT

## 2024-08-22 PROCEDURE — 93306 TTE W/DOPPLER COMPLETE: CPT

## 2024-08-22 PROCEDURE — 80048 BASIC METABOLIC PNL TOTAL CA: CPT

## 2024-08-22 PROCEDURE — 97130 THER IVNTJ EA ADDL 15 MIN: CPT

## 2024-08-22 PROCEDURE — 85730 THROMBOPLASTIN TIME PARTIAL: CPT

## 2024-08-22 PROCEDURE — 84132 ASSAY OF SERUM POTASSIUM: CPT

## 2024-08-22 PROCEDURE — 84295 ASSAY OF SERUM SODIUM: CPT

## 2024-08-22 PROCEDURE — 70496 CT ANGIOGRAPHY HEAD: CPT | Mod: MC

## 2024-08-22 PROCEDURE — 70450 CT HEAD/BRAIN W/O DYE: CPT | Mod: MC

## 2024-08-22 PROCEDURE — 82962 GLUCOSE BLOOD TEST: CPT

## 2024-08-22 PROCEDURE — 82330 ASSAY OF CALCIUM: CPT

## 2024-08-22 PROCEDURE — 97161 PT EVAL LOW COMPLEX 20 MIN: CPT

## 2024-08-22 PROCEDURE — 80061 LIPID PANEL: CPT

## 2024-08-22 PROCEDURE — 99285 EMERGENCY DEPT VISIT HI MDM: CPT

## 2024-08-22 PROCEDURE — 80053 COMPREHEN METABOLIC PANEL: CPT

## 2024-08-22 PROCEDURE — 84100 ASSAY OF PHOSPHORUS: CPT

## 2024-08-22 PROCEDURE — 85014 HEMATOCRIT: CPT

## 2024-08-22 PROCEDURE — 93356 MYOCRD STRAIN IMG SPCKL TRCK: CPT

## 2024-08-22 PROCEDURE — 97165 OT EVAL LOW COMPLEX 30 MIN: CPT

## 2024-08-22 PROCEDURE — 83036 HEMOGLOBIN GLYCOSYLATED A1C: CPT

## 2024-08-22 PROCEDURE — 85027 COMPLETE CBC AUTOMATED: CPT

## 2024-08-22 PROCEDURE — 70498 CT ANGIOGRAPHY NECK: CPT | Mod: MC

## 2024-08-22 PROCEDURE — 71045 X-RAY EXAM CHEST 1 VIEW: CPT

## 2024-08-22 PROCEDURE — 83605 ASSAY OF LACTIC ACID: CPT

## 2024-08-22 PROCEDURE — 97129 THER IVNTJ 1ST 15 MIN: CPT

## 2024-08-22 PROCEDURE — 83735 ASSAY OF MAGNESIUM: CPT

## 2024-08-27 ENCOUNTER — APPOINTMENT (OUTPATIENT)
Dept: CARDIOLOGY | Facility: HOSPITAL | Age: 74
End: 2024-08-27

## 2024-09-10 ENCOUNTER — OUTPATIENT (OUTPATIENT)
Dept: OUTPATIENT SERVICES | Facility: HOSPITAL | Age: 74
LOS: 1 days | End: 2024-09-10
Payer: SELF-PAY

## 2024-09-10 ENCOUNTER — APPOINTMENT (OUTPATIENT)
Dept: INTERNAL MEDICINE | Facility: CLINIC | Age: 74
End: 2024-09-10
Payer: COMMERCIAL

## 2024-09-10 VITALS
OXYGEN SATURATION: 98 % | DIASTOLIC BLOOD PRESSURE: 100 MMHG | SYSTOLIC BLOOD PRESSURE: 160 MMHG | HEIGHT: 63 IN | BODY MASS INDEX: 27.82 KG/M2 | HEART RATE: 105 BPM | WEIGHT: 157 LBS

## 2024-09-10 DIAGNOSIS — I10 ESSENTIAL (PRIMARY) HYPERTENSION: ICD-10-CM

## 2024-09-10 DIAGNOSIS — E11.9 TYPE 2 DIABETES MELLITUS W/OUT COMPLICATIONS: ICD-10-CM

## 2024-09-10 DIAGNOSIS — E78.5 HYPERLIPIDEMIA, UNSPECIFIED: ICD-10-CM

## 2024-09-10 DIAGNOSIS — Z00.00 ENCOUNTER FOR GENERAL ADULT MEDICAL EXAMINATION W/OUT ABNORMAL FINDINGS: ICD-10-CM

## 2024-09-10 DIAGNOSIS — I63.81 OTHER CEREBRAL INFARCTION DUE TO OCCLUSION OR STENOSIS OF SMALL ARTERY: ICD-10-CM

## 2024-09-10 DIAGNOSIS — Z23 ENCOUNTER FOR IMMUNIZATION: ICD-10-CM

## 2024-09-10 PROBLEM — I63.9 CEREBRAL INFARCTION, UNSPECIFIED: Chronic | Status: ACTIVE | Noted: 2024-08-21

## 2024-09-10 PROCEDURE — 99215 OFFICE O/P EST HI 40 MIN: CPT | Mod: GC,25

## 2024-09-10 PROCEDURE — G2211 COMPLEX E/M VISIT ADD ON: CPT | Mod: NC

## 2024-09-10 PROCEDURE — G0463: CPT

## 2024-09-10 PROCEDURE — 80053 COMPREHEN METABOLIC PANEL: CPT

## 2024-09-10 PROCEDURE — 85027 COMPLETE CBC AUTOMATED: CPT

## 2024-09-10 PROCEDURE — 80061 LIPID PANEL: CPT

## 2024-09-10 PROCEDURE — 83036 HEMOGLOBIN GLYCOSYLATED A1C: CPT

## 2024-09-10 PROCEDURE — 90677 PCV20 VACCINE IM: CPT

## 2024-09-10 PROCEDURE — G0438: CPT

## 2024-09-10 PROCEDURE — 99387 INIT PM E/M NEW PAT 65+ YRS: CPT | Mod: GC

## 2024-09-10 PROCEDURE — G0009: CPT

## 2024-09-10 PROCEDURE — 82043 UR ALBUMIN QUANTITATIVE: CPT

## 2024-09-10 RX ORDER — PNEUMOCOCCAL 20-VALENT CONJUGATE VACCINE 2.2; 2.2; 2.2; 2.2; 2.2; 2.2; 2.2; 2.2; 2.2; 2.2; 2.2; 2.2; 2.2; 2.2; 2.2; 2.2; 4.4; 2.2; 2.2; 2.2 UG/.5ML; UG/.5ML; UG/.5ML; UG/.5ML; UG/.5ML; UG/.5ML; UG/.5ML; UG/.5ML; UG/.5ML; UG/.5ML; UG/.5ML; UG/.5ML; UG/.5ML; UG/.5ML; UG/.5ML; UG/.5ML; UG/.5ML; UG/.5ML; UG/.5ML; UG/.5ML
INJECTION, SUSPENSION INTRAMUSCULAR
Qty: 1 | Refills: 0 | Status: COMPLETED | COMMUNITY
Start: 2024-09-10 | End: 2024-09-10

## 2024-09-10 NOTE — PHYSICAL EXAM
[Normal] : soft, non-tender, non-distended, no masses palpated, no HSM and normal bowel sounds [Right Foot Was Examined] : Right foot ~C was examined [Left Foot Was Examined] : left foot ~C was examined [None] : no ulcers in either foot were found [] : left foot [___] : left foot points [unfilled] [de-identified] : R-sided 4/5 weakness [TWNoteComboBox1] : +2 [TWNoteComboBox2] : +2

## 2024-09-10 NOTE — HISTORY OF PRESENT ILLNESS
[Family Member] : family member [FreeTextEntry1] : SHANELLE, recently hospitailized 8/2-8/4 for L-internal capsule stroke, with R-sided weakness [de-identified] : 74F hx of uncontrolled T2DM (a1c 10.7 8/2024), HLD, uncontrolled HTN, recent CVA (R-sided deficit) on 8/2-8/4, discharged with metformin, repaglinide, losartan, DAPT, atorvastatin 80mg, recently had another ER visit for high blood pressure (hypertensive urgency) increase amlodipine to 10mg. Patient unable to tolerate metformin due to GI side effects has discontinued herself. Admitted to being compliant to rest of medication.  labs during hospitalization Cr. 1.12 a1c 10.7   following with neurology 8/13/24: continue DAPT from time of stroke 8/2-8/4, unable to tolerate metformin, has dced plavix appropriately following with cardiology 8/13/24; ECG, TTE showed LVH, G1DD, LV concentric hypertrophy  #T2DM check BG 4x a day, fasting 200+. post meal 170+ taking repaglinide without issue no hypoglycemic events  #HTN - on losartan 50mg and amlodipine 10mg - home bp: 150+/80+

## 2024-09-10 NOTE — PHYSICAL EXAM
[Normal] : soft, non-tender, non-distended, no masses palpated, no HSM and normal bowel sounds [Right Foot Was Examined] : Right foot ~C was examined [Left Foot Was Examined] : left foot ~C was examined [None] : no ulcers in either foot were found [] : left foot [___] : left foot points [unfilled] [de-identified] : R-sided 4/5 weakness [TWNoteComboBox1] : +2 [TWNoteComboBox2] : +2

## 2024-09-10 NOTE — ASSESSMENT
[FreeTextEntry1] : 74F with uncontrolled HTN, uncontrolled T2DM, recent CVA (ASA/Statin)-R-sided deficit, following with cardiology and neurology presented for CPE, first time CPE.

## 2024-09-11 LAB
ALBUMIN SERPL ELPH-MCNC: 4.2 G/DL
ALP BLD-CCNC: 131 U/L
ALT SERPL-CCNC: 23 U/L
ANION GAP SERPL CALC-SCNC: 13 MMOL/L
AST SERPL-CCNC: 16 U/L
BILIRUB SERPL-MCNC: 0.4 MG/DL
BUN SERPL-MCNC: 18 MG/DL
CALCIUM SERPL-MCNC: 9.5 MG/DL
CHLORIDE SERPL-SCNC: 98 MMOL/L
CHOLEST SERPL-MCNC: 91 MG/DL
CO2 SERPL-SCNC: 25 MMOL/L
CREAT SERPL-MCNC: 1.1 MG/DL
CREAT SPEC-SCNC: 228 MG/DL
EGFR: 52 ML/MIN/1.73M2
ESTIMATED AVERAGE GLUCOSE: 232 MG/DL
GLUCOSE SERPL-MCNC: 299 MG/DL
HBA1C MFR BLD HPLC: 9.7 %
HCT VFR BLD CALC: 40.5 %
HDLC SERPL-MCNC: 34 MG/DL
HGB BLD-MCNC: 13.3 G/DL
LDLC SERPL CALC-MCNC: 34 MG/DL
MCHC RBC-ENTMCNC: 29.8 PG
MCHC RBC-ENTMCNC: 32.8 GM/DL
MCV RBC AUTO: 90.6 FL
MICROALBUMIN 24H UR DL<=1MG/L-MCNC: 1.6 MG/DL
MICROALBUMIN/CREAT 24H UR-RTO: 7 MG/G
NONHDLC SERPL-MCNC: 57 MG/DL
PLATELET # BLD AUTO: 335 K/UL
POTASSIUM SERPL-SCNC: 3.7 MMOL/L
PROT SERPL-MCNC: 8.5 G/DL
RBC # BLD: 4.47 M/UL
RBC # FLD: 12.7 %
SODIUM SERPL-SCNC: 136 MMOL/L
TRIGL SERPL-MCNC: 129 MG/DL
WBC # FLD AUTO: 8.54 K/UL

## 2024-09-16 DIAGNOSIS — E78.5 HYPERLIPIDEMIA, UNSPECIFIED: ICD-10-CM

## 2024-09-16 DIAGNOSIS — Z00.00 ENCOUNTER FOR GENERAL ADULT MEDICAL EXAMINATION WITHOUT ABNORMAL FINDINGS: ICD-10-CM

## 2024-09-16 DIAGNOSIS — Z23 ENCOUNTER FOR IMMUNIZATION: ICD-10-CM

## 2024-09-16 DIAGNOSIS — E11.9 TYPE 2 DIABETES MELLITUS WITHOUT COMPLICATIONS: ICD-10-CM

## 2024-09-16 DIAGNOSIS — I63.81 OTHER CEREBRAL INFARCTION DUE TO OCCLUSION OR STENOSIS OF SMALL ARTERY: ICD-10-CM

## 2024-09-17 ENCOUNTER — APPOINTMENT (OUTPATIENT)
Dept: INTERNAL MEDICINE | Facility: CLINIC | Age: 74
End: 2024-09-17

## 2024-09-17 RX ORDER — DULAGLUTIDE 0.75 MG/.5ML
0.75 INJECTION, SOLUTION SUBCUTANEOUS
Qty: 12 | Refills: 1 | Status: ACTIVE | COMMUNITY
Start: 2024-09-17 | End: 1900-01-01

## 2024-09-19 ENCOUNTER — APPOINTMENT (OUTPATIENT)
Dept: CARDIOLOGY | Facility: HOSPITAL | Age: 74
End: 2024-09-19

## 2024-09-19 NOTE — HISTORY OF PRESENT ILLNESS
[FreeTextEntry1] : 74F PMHx DM2, uncontrolled HTN, HLD, recent CVA (L-internal capsule infarct) c/b R facial drrop and dysarthria presenting for f/u of HTN. Noted during hospitalization for CVA (-24) to have HTN to SBP >200.    EC24: sinus rhythm with LBBB, LAD, LVH   Echo: 24: CONCLUSIONS:  1. Left ventricular cavity is normal in size. Left ventricular wall thickness is normal. Left ventricular systolic function is normal with an ejection fraction visually estimated at 50 to 55 %. There are no regional wall motion abnormalities seen.  2. There is mild (grade 1) left ventricular diastolic dysfunction, with normal left ventricular filling pressure.  3. Normal right ventricular cavity size, with normal wall thickness, and normal right ventricular systolic function. Tricuspid annular plane systolic excursion (TAPSE) is 1.6 cm (normal >=1.7 cm). Tricuspid annular tissue Doppler S' is 13.4 cm/s (normal >10 cm/s).  4. No pericardial effusion seen.  5. No prior echocardiogram is available for comparison.  6. There is increased LV mass and concentric hypertrophy.  Relevant labs: Hgb 13.2 Cr 1.1  A1c 10.7 TSH 1.06  PMH PSH:  FH:  SH: Lives with daughter in a house in [place]. No tobacco use, No EtOH use, no drug use Ambulates without assistance Meds:  - aspirin 81 mg daily - plavix 75 mg daily - metformin  mg BID - repaglinide 0.5 mg TID with meals - losartan 50 mg daily - atorvastatin 80 mg daily All:    #Hypertension BP today  - continue losartan 50 mg daily - ?increase amlodipine to 10 mg daily - maintain BP log at home, and bring to next visit  #Hyperlipideima - continue atorvastatin 80 mg daily - repeat lipid panel in ~6 weeks (2024)  #Ischemic stroke (L-internal capsule) - anti-platelet agents per neurology

## 2024-09-20 ENCOUNTER — APPOINTMENT (OUTPATIENT)
Dept: CARDIOLOGY | Facility: CLINIC | Age: 74
End: 2024-09-20

## 2024-10-01 ENCOUNTER — APPOINTMENT (OUTPATIENT)
Dept: INTERNAL MEDICINE | Facility: CLINIC | Age: 74
End: 2024-10-01

## 2024-10-08 ENCOUNTER — APPOINTMENT (OUTPATIENT)
Dept: OTOLARYNGOLOGY | Facility: CLINIC | Age: 74
End: 2024-10-08
Payer: COMMERCIAL

## 2024-10-08 PROCEDURE — 92523 SPEECH SOUND LANG COMPREHEN: CPT | Mod: GN

## 2024-10-14 ENCOUNTER — OUTPATIENT (OUTPATIENT)
Dept: OUTPATIENT SERVICES | Facility: HOSPITAL | Age: 74
LOS: 1 days | End: 2024-10-14
Payer: SELF-PAY

## 2024-10-14 ENCOUNTER — APPOINTMENT (OUTPATIENT)
Dept: INTERNAL MEDICINE | Facility: CLINIC | Age: 74
End: 2024-10-14
Payer: COMMERCIAL

## 2024-10-14 VITALS
OXYGEN SATURATION: 98 % | BODY MASS INDEX: 27.29 KG/M2 | HEIGHT: 63 IN | DIASTOLIC BLOOD PRESSURE: 90 MMHG | WEIGHT: 154 LBS | SYSTOLIC BLOOD PRESSURE: 140 MMHG | HEART RATE: 96 BPM

## 2024-10-14 DIAGNOSIS — I10 ESSENTIAL (PRIMARY) HYPERTENSION: ICD-10-CM

## 2024-10-14 DIAGNOSIS — I63.81 OTHER CEREBRAL INFARCTION DUE TO OCCLUSION OR STENOSIS OF SMALL ARTERY: ICD-10-CM

## 2024-10-14 DIAGNOSIS — E11.9 TYPE 2 DIABETES MELLITUS W/OUT COMPLICATIONS: ICD-10-CM

## 2024-10-14 PROCEDURE — G0463: CPT

## 2024-10-14 PROCEDURE — 83036 HEMOGLOBIN GLYCOSYLATED A1C: CPT

## 2024-10-14 PROCEDURE — G2211 COMPLEX E/M VISIT ADD ON: CPT

## 2024-10-14 PROCEDURE — 99214 OFFICE O/P EST MOD 30 MIN: CPT | Mod: GC

## 2024-10-14 RX ORDER — AMLODIPINE BESYLATE 10 MG/1
10 TABLET ORAL DAILY
Qty: 90 | Refills: 1 | Status: ACTIVE | COMMUNITY
Start: 2024-10-14

## 2024-10-15 RX ORDER — AMLODIPINE BESYLATE 10 MG/1
10 TABLET ORAL DAILY
Qty: 90 | Refills: 3 | Status: ACTIVE | COMMUNITY
Start: 2024-10-15 | End: 1900-01-01

## 2024-10-21 DIAGNOSIS — I63.81 OTHER CEREBRAL INFARCTION DUE TO OCCLUSION OR STENOSIS OF SMALL ARTERY: ICD-10-CM

## 2024-10-21 DIAGNOSIS — E11.9 TYPE 2 DIABETES MELLITUS WITHOUT COMPLICATIONS: ICD-10-CM

## 2024-10-22 ENCOUNTER — APPOINTMENT (OUTPATIENT)
Dept: OTOLARYNGOLOGY | Facility: CLINIC | Age: 74
End: 2024-10-22
Payer: COMMERCIAL

## 2024-10-22 PROCEDURE — 92507 TX SP LANG VOICE COMM INDIV: CPT | Mod: GN

## 2024-10-28 ENCOUNTER — APPOINTMENT (OUTPATIENT)
Dept: OTOLARYNGOLOGY | Facility: CLINIC | Age: 74
End: 2024-10-28
Payer: COMMERCIAL

## 2024-10-28 PROCEDURE — 92507 TX SP LANG VOICE COMM INDIV: CPT | Mod: GN

## 2024-11-05 ENCOUNTER — APPOINTMENT (OUTPATIENT)
Dept: OTOLARYNGOLOGY | Facility: CLINIC | Age: 74
End: 2024-11-05

## 2024-11-15 ENCOUNTER — APPOINTMENT (OUTPATIENT)
Dept: OTOLARYNGOLOGY | Facility: CLINIC | Age: 74
End: 2024-11-15
Payer: COMMERCIAL

## 2024-11-15 PROCEDURE — 92507 TX SP LANG VOICE COMM INDIV: CPT | Mod: GN

## 2024-11-18 ENCOUNTER — APPOINTMENT (OUTPATIENT)
Dept: INTERNAL MEDICINE | Facility: CLINIC | Age: 74
End: 2024-11-18
Payer: SUBSIDIZED

## 2024-11-18 ENCOUNTER — OUTPATIENT (OUTPATIENT)
Dept: OUTPATIENT SERVICES | Facility: HOSPITAL | Age: 74
LOS: 1 days | End: 2024-11-18
Payer: SELF-PAY

## 2024-11-18 ENCOUNTER — NON-APPOINTMENT (OUTPATIENT)
Age: 74
End: 2024-11-18

## 2024-11-18 VITALS
WEIGHT: 155 LBS | DIASTOLIC BLOOD PRESSURE: 86 MMHG | SYSTOLIC BLOOD PRESSURE: 140 MMHG | BODY MASS INDEX: 27.46 KG/M2 | HEART RATE: 98 BPM | HEIGHT: 63 IN | OXYGEN SATURATION: 95 %

## 2024-11-18 DIAGNOSIS — E78.5 HYPERLIPIDEMIA, UNSPECIFIED: ICD-10-CM

## 2024-11-18 DIAGNOSIS — I10 ESSENTIAL (PRIMARY) HYPERTENSION: ICD-10-CM

## 2024-11-18 DIAGNOSIS — Z00.00 ENCOUNTER FOR GENERAL ADULT MEDICAL EXAMINATION W/OUT ABNORMAL FINDINGS: ICD-10-CM

## 2024-11-18 DIAGNOSIS — E11.9 TYPE 2 DIABETES MELLITUS W/OUT COMPLICATIONS: ICD-10-CM

## 2024-11-18 DIAGNOSIS — I63.81 OTHER CEREBRAL INFARCTION DUE TO OCCLUSION OR STENOSIS OF SMALL ARTERY: ICD-10-CM

## 2024-11-18 PROCEDURE — G2211 COMPLEX E/M VISIT ADD ON: CPT

## 2024-11-18 PROCEDURE — G0463: CPT

## 2024-11-18 PROCEDURE — 83036 HEMOGLOBIN GLYCOSYLATED A1C: CPT

## 2024-11-18 PROCEDURE — 99214 OFFICE O/P EST MOD 30 MIN: CPT | Mod: GC

## 2024-11-19 ENCOUNTER — NON-APPOINTMENT (OUTPATIENT)
Age: 74
End: 2024-11-19

## 2024-11-19 LAB — HBA1C MFR BLD HPLC: 7.5

## 2024-11-20 RX ORDER — HYDROCHLOROTHIAZIDE 12.5 MG/1
12.5 TABLET ORAL
Qty: 30 | Refills: 2 | Status: ACTIVE | COMMUNITY
Start: 2024-11-18 | End: 1900-01-01

## 2024-11-21 ENCOUNTER — APPOINTMENT (OUTPATIENT)
Dept: ENDOCRINOLOGY | Facility: HOSPITAL | Age: 74
End: 2024-11-21

## 2024-11-22 ENCOUNTER — APPOINTMENT (OUTPATIENT)
Dept: OTOLARYNGOLOGY | Facility: CLINIC | Age: 74
End: 2024-11-22
Payer: COMMERCIAL

## 2024-11-22 PROCEDURE — 92507 TX SP LANG VOICE COMM INDIV: CPT | Mod: GN

## 2024-12-03 DIAGNOSIS — I63.81 OTHER CEREBRAL INFARCTION DUE TO OCCLUSION OR STENOSIS OF SMALL ARTERY: ICD-10-CM

## 2024-12-03 DIAGNOSIS — E11.9 TYPE 2 DIABETES MELLITUS WITHOUT COMPLICATIONS: ICD-10-CM

## 2024-12-03 DIAGNOSIS — E78.5 HYPERLIPIDEMIA, UNSPECIFIED: ICD-10-CM

## 2024-12-06 ENCOUNTER — APPOINTMENT (OUTPATIENT)
Dept: OTOLARYNGOLOGY | Facility: CLINIC | Age: 74
End: 2024-12-06
Payer: COMMERCIAL

## 2024-12-06 PROCEDURE — 92507 TX SP LANG VOICE COMM INDIV: CPT | Mod: GN

## 2024-12-10 ENCOUNTER — APPOINTMENT (OUTPATIENT)
Dept: OTOLARYNGOLOGY | Facility: CLINIC | Age: 74
End: 2024-12-10
Payer: COMMERCIAL

## 2024-12-10 PROCEDURE — 92507 TX SP LANG VOICE COMM INDIV: CPT | Mod: GN

## 2024-12-17 ENCOUNTER — APPOINTMENT (OUTPATIENT)
Dept: OTOLARYNGOLOGY | Facility: CLINIC | Age: 74
End: 2024-12-17
Payer: COMMERCIAL

## 2024-12-17 PROCEDURE — 92507 TX SP LANG VOICE COMM INDIV: CPT | Mod: GN

## 2024-12-31 ENCOUNTER — APPOINTMENT (OUTPATIENT)
Dept: OTOLARYNGOLOGY | Facility: CLINIC | Age: 74
End: 2024-12-31
Payer: COMMERCIAL

## 2024-12-31 PROCEDURE — 92507 TX SP LANG VOICE COMM INDIV: CPT | Mod: GN

## 2025-02-14 ENCOUNTER — RX RENEWAL (OUTPATIENT)
Age: 75
End: 2025-02-14

## 2025-02-14 NOTE — PATIENT PROFILE ADULT - DO YOU FEEL THREATENED BY OTHERS?
Pt inquiring if able to get PO oxygen concentrator.  Uses 6L O2 with activity and 4L at rest.  Writer will forward to RT   no

## 2025-03-03 ENCOUNTER — OUTPATIENT (OUTPATIENT)
Dept: OUTPATIENT SERVICES | Facility: HOSPITAL | Age: 75
LOS: 1 days | End: 2025-03-03
Payer: SELF-PAY

## 2025-03-03 ENCOUNTER — TRANSCRIPTION ENCOUNTER (OUTPATIENT)
Age: 75
End: 2025-03-03

## 2025-03-03 ENCOUNTER — APPOINTMENT (OUTPATIENT)
Dept: INTERNAL MEDICINE | Facility: CLINIC | Age: 75
End: 2025-03-03
Payer: SUBSIDIZED

## 2025-03-03 VITALS
DIASTOLIC BLOOD PRESSURE: 90 MMHG | BODY MASS INDEX: 27.99 KG/M2 | HEART RATE: 98 BPM | OXYGEN SATURATION: 99 % | WEIGHT: 158 LBS | SYSTOLIC BLOOD PRESSURE: 150 MMHG

## 2025-03-03 DIAGNOSIS — E11.9 TYPE 2 DIABETES MELLITUS W/OUT COMPLICATIONS: ICD-10-CM

## 2025-03-03 PROCEDURE — 99215 OFFICE O/P EST HI 40 MIN: CPT | Mod: GC

## 2025-03-03 PROCEDURE — 83036 HEMOGLOBIN GLYCOSYLATED A1C: CPT

## 2025-03-03 PROCEDURE — G0463: CPT

## 2025-03-03 PROCEDURE — G2211 COMPLEX E/M VISIT ADD ON: CPT

## 2025-03-03 RX ORDER — DULAGLUTIDE 1.5 MG/.5ML
1.5 INJECTION, SOLUTION SUBCUTANEOUS
Qty: 4 | Refills: 1 | Status: ACTIVE | COMMUNITY
Start: 2025-02-28 | End: 1900-01-01

## 2025-03-04 LAB — HBA1C MFR BLD HPLC: 8.4

## 2025-03-06 DIAGNOSIS — I10 ESSENTIAL (PRIMARY) HYPERTENSION: ICD-10-CM

## 2025-03-10 DIAGNOSIS — E11.9 TYPE 2 DIABETES MELLITUS WITHOUT COMPLICATIONS: ICD-10-CM

## 2025-03-24 ENCOUNTER — RX RENEWAL (OUTPATIENT)
Age: 75
End: 2025-03-24

## 2025-03-31 ENCOUNTER — RX RENEWAL (OUTPATIENT)
Age: 75
End: 2025-03-31

## 2025-04-07 ENCOUNTER — NON-APPOINTMENT (OUTPATIENT)
Age: 75
End: 2025-04-07

## 2025-04-14 PROCEDURE — 83036 HEMOGLOBIN GLYCOSYLATED A1C: CPT

## 2025-05-12 ENCOUNTER — APPOINTMENT (OUTPATIENT)
Dept: INTERNAL MEDICINE | Facility: CLINIC | Age: 75
End: 2025-05-12
Payer: MEDICAID

## 2025-05-12 ENCOUNTER — OUTPATIENT (OUTPATIENT)
Dept: OUTPATIENT SERVICES | Facility: HOSPITAL | Age: 75
LOS: 1 days | End: 2025-05-12
Payer: MEDICAID

## 2025-05-12 VITALS
HEIGHT: 63 IN | SYSTOLIC BLOOD PRESSURE: 140 MMHG | WEIGHT: 155 LBS | DIASTOLIC BLOOD PRESSURE: 80 MMHG | BODY MASS INDEX: 27.46 KG/M2 | HEART RATE: 100 BPM | OXYGEN SATURATION: 98 %

## 2025-05-12 DIAGNOSIS — I10 ESSENTIAL (PRIMARY) HYPERTENSION: ICD-10-CM

## 2025-05-12 DIAGNOSIS — E11.9 TYPE 2 DIABETES MELLITUS W/OUT COMPLICATIONS: ICD-10-CM

## 2025-05-12 PROCEDURE — G2211 COMPLEX E/M VISIT ADD ON: CPT | Mod: NC

## 2025-05-12 PROCEDURE — G0463: CPT

## 2025-05-12 PROCEDURE — 99214 OFFICE O/P EST MOD 30 MIN: CPT | Mod: GC

## 2025-05-14 LAB — HBA1C MFR BLD HPLC: 7.7

## 2025-05-19 DIAGNOSIS — E11.9 TYPE 2 DIABETES MELLITUS WITHOUT COMPLICATIONS: ICD-10-CM

## 2025-06-02 ENCOUNTER — APPOINTMENT (OUTPATIENT)
Dept: INTERNAL MEDICINE | Facility: CLINIC | Age: 75
End: 2025-06-02

## 2025-07-22 ENCOUNTER — NON-APPOINTMENT (OUTPATIENT)
Age: 75
End: 2025-07-22

## 2025-07-22 ENCOUNTER — APPOINTMENT (OUTPATIENT)
Dept: OPHTHALMOLOGY | Facility: CLINIC | Age: 75
End: 2025-07-22
Payer: COMMERCIAL

## 2025-07-22 PROCEDURE — 92134 CPTRZ OPH DX IMG PST SGM RTA: CPT

## 2025-07-22 PROCEDURE — 92004 COMPRE OPH EXAM NEW PT 1/>: CPT

## 2025-07-25 ENCOUNTER — APPOINTMENT (OUTPATIENT)
Dept: INTERNAL MEDICINE | Facility: CLINIC | Age: 75
End: 2025-07-25
Payer: MEDICAID

## 2025-07-25 ENCOUNTER — OUTPATIENT (OUTPATIENT)
Dept: OUTPATIENT SERVICES | Facility: HOSPITAL | Age: 75
LOS: 1 days | End: 2025-07-25
Payer: MEDICAID

## 2025-07-25 VITALS
HEART RATE: 99 BPM | HEIGHT: 63 IN | WEIGHT: 156 LBS | BODY MASS INDEX: 27.64 KG/M2 | DIASTOLIC BLOOD PRESSURE: 70 MMHG | SYSTOLIC BLOOD PRESSURE: 134 MMHG | OXYGEN SATURATION: 99 %

## 2025-07-25 DIAGNOSIS — I10 ESSENTIAL (PRIMARY) HYPERTENSION: ICD-10-CM

## 2025-07-25 DIAGNOSIS — Z00.00 ENCOUNTER FOR GENERAL ADULT MEDICAL EXAMINATION W/OUT ABNORMAL FINDINGS: ICD-10-CM

## 2025-07-25 DIAGNOSIS — E78.5 HYPERLIPIDEMIA, UNSPECIFIED: ICD-10-CM

## 2025-07-25 DIAGNOSIS — I63.81 OTHER CEREBRAL INFARCTION DUE TO OCCLUSION OR STENOSIS OF SMALL ARTERY: ICD-10-CM

## 2025-07-25 PROCEDURE — 82043 UR ALBUMIN QUANTITATIVE: CPT

## 2025-07-25 PROCEDURE — 99213 OFFICE O/P EST LOW 20 MIN: CPT | Mod: GE

## 2025-07-25 PROCEDURE — 84443 ASSAY THYROID STIM HORMONE: CPT

## 2025-07-25 PROCEDURE — 80053 COMPREHEN METABOLIC PANEL: CPT

## 2025-07-25 PROCEDURE — 82570 ASSAY OF URINE CREATININE: CPT

## 2025-07-25 PROCEDURE — 80061 LIPID PANEL: CPT

## 2025-07-25 PROCEDURE — G0463: CPT

## 2025-07-25 PROCEDURE — 83036 HEMOGLOBIN GLYCOSYLATED A1C: CPT

## 2025-07-25 PROCEDURE — 84156 ASSAY OF PROTEIN URINE: CPT

## 2025-07-25 PROCEDURE — 81001 URINALYSIS AUTO W/SCOPE: CPT

## 2025-07-25 PROCEDURE — G2211 COMPLEX E/M VISIT ADD ON: CPT | Mod: NC

## 2025-07-25 PROCEDURE — 85027 COMPLETE CBC AUTOMATED: CPT

## 2025-07-28 DIAGNOSIS — E11.9 TYPE 2 DIABETES MELLITUS W/OUT COMPLICATIONS: ICD-10-CM

## 2025-07-28 LAB
ALBUMIN SERPL ELPH-MCNC: 4.4 G/DL
ALBUMIN, RANDOM URINE: 2.7 MG/DL
ALP BLD-CCNC: 117 U/L
ALT SERPL-CCNC: 12 U/L
ANION GAP SERPL CALC-SCNC: 17 MMOL/L
APPEARANCE: CLEAR
AST SERPL-CCNC: 17 U/L
BACTERIA: NEGATIVE /HPF
BILIRUB SERPL-MCNC: 0.5 MG/DL
BILIRUBIN URINE: NEGATIVE
BLOOD URINE: NEGATIVE
BUN SERPL-MCNC: 29 MG/DL
CALCIUM OXALATE CRYSTALS: NORMAL
CALCIUM SERPL-MCNC: 9.9 MG/DL
CHLORIDE SERPL-SCNC: 94 MMOL/L
CHOLEST SERPL-MCNC: 190 MG/DL
CO2 SERPL-SCNC: 25 MMOL/L
COLOR: YELLOW
CREAT SERPL-MCNC: 1.5 MG/DL
CREAT SPEC-SCNC: 420 MG/DL
CREAT SPEC-SCNC: 420 MG/DL
CREAT/PROT UR: 0.1 RATIO
EGFRCR SERPLBLD CKD-EPI 2021: 36 ML/MIN/1.73M2
ESTIMATED AVERAGE GLUCOSE: 200 MG/DL
GLUCOSE QUALITATIVE U: NEGATIVE
GLUCOSE SERPL-MCNC: 211 MG/DL
HBA1C MFR BLD HPLC: 8.6 %
HCT VFR BLD CALC: 37.1 %
HDLC SERPL-MCNC: 38 MG/DL
HGB BLD-MCNC: 12.5 G/DL
HYALINE CASTS: NORMAL
KETONES URINE: NORMAL
LDLC SERPL-MCNC: 115 MG/DL
LEUKOCYTE ESTERASE URINE: NEGATIVE
MCHC RBC-ENTMCNC: 29.6 PG
MCHC RBC-ENTMCNC: 33.7 G/DL
MCV RBC AUTO: 87.7 FL
MICROALBUMIN/CREAT 24H UR-RTO: 6 MG/G
MICROSCOPIC-UA: NORMAL
NITRITE URINE: NEGATIVE
NONHDLC SERPL-MCNC: 151 MG/DL
PH URINE: 6
PLATELET # BLD AUTO: 391 K/UL
POTASSIUM SERPL-SCNC: 3 MMOL/L
PROT SERPL-MCNC: 8.8 G/DL
PROT UR-MCNC: 27 MG/DL
PROTEIN URINE: ABNORMAL
RBC # BLD: 4.23 M/UL
RBC # FLD: 13.1 %
RED BLOOD CELLS URINE: 0 /HPF
SODIUM SERPL-SCNC: 136 MMOL/L
SPECIFIC GRAVITY URINE: >=1.03
SQUAMOUS EPITHELIAL CELLS: NORMAL
TRIGL SERPL-MCNC: 205 MG/DL
TSH SERPL-ACNC: 1.09 UIU/ML
UROBILINOGEN URINE: NORMAL
WBC # FLD AUTO: 9.19 K/UL
WHITE BLOOD CELLS URINE: 2 /HPF

## 2025-07-28 RX ORDER — DAPAGLIFLOZIN 10 MG/1
10 TABLET, FILM COATED ORAL DAILY
Qty: 1 | Refills: 0 | Status: ACTIVE | COMMUNITY
Start: 2025-07-28 | End: 1900-01-01

## 2025-07-29 ENCOUNTER — NON-APPOINTMENT (OUTPATIENT)
Age: 75
End: 2025-07-29

## 2025-07-29 LAB
ALBUMIN SERPL ELPH-MCNC: 4.1 G/DL
ALP BLD-CCNC: 111 U/L
ALT SERPL-CCNC: 9 U/L
ANION GAP SERPL CALC-SCNC: 14 MMOL/L
AST SERPL-CCNC: 16 U/L
BILIRUB SERPL-MCNC: 0.4 MG/DL
BUN SERPL-MCNC: 27 MG/DL
CALCIUM SERPL-MCNC: 9.6 MG/DL
CHLORIDE SERPL-SCNC: 92 MMOL/L
CO2 SERPL-SCNC: 27 MMOL/L
CREAT SERPL-MCNC: 1.36 MG/DL
EGFRCR SERPLBLD CKD-EPI 2021: 40 ML/MIN/1.73M2
GLUCOSE SERPL-MCNC: 202 MG/DL
POTASSIUM SERPL-SCNC: 3.2 MMOL/L
PROT SERPL-MCNC: 8.2 G/DL
SODIUM SERPL-SCNC: 133 MMOL/L

## 2025-08-05 DIAGNOSIS — Z00.00 ENCOUNTER FOR GENERAL ADULT MEDICAL EXAMINATION WITHOUT ABNORMAL FINDINGS: ICD-10-CM

## 2025-08-05 DIAGNOSIS — E78.5 HYPERLIPIDEMIA, UNSPECIFIED: ICD-10-CM

## 2025-08-05 DIAGNOSIS — E11.9 TYPE 2 DIABETES MELLITUS WITHOUT COMPLICATIONS: ICD-10-CM

## 2025-08-05 DIAGNOSIS — I63.81 OTHER CEREBRAL INFARCTION DUE TO OCCLUSION OR STENOSIS OF SMALL ARTERY: ICD-10-CM
